# Patient Record
Sex: FEMALE | Race: WHITE | Employment: UNEMPLOYED | URBAN - METROPOLITAN AREA
[De-identification: names, ages, dates, MRNs, and addresses within clinical notes are randomized per-mention and may not be internally consistent; named-entity substitution may affect disease eponyms.]

---

## 2017-02-21 RX ORDER — OMEPRAZOLE 20 MG/1
20 CAPSULE, DELAYED RELEASE ORAL DAILY
Qty: 30 CAPSULE | Refills: 3 | Status: SHIPPED | OUTPATIENT
Start: 2017-02-21 | End: 2017-08-02 | Stop reason: SDUPTHER

## 2017-05-25 ENCOUNTER — HOSPITAL ENCOUNTER (EMERGENCY)
Age: 42
Discharge: HOME | End: 2017-05-25
Payer: COMMERCIAL

## 2017-05-25 ENCOUNTER — APPOINTMENT (OUTPATIENT)
Dept: CT IMAGING | Age: 42
End: 2017-05-25
Payer: COMMERCIAL

## 2017-05-25 VITALS
DIASTOLIC BLOOD PRESSURE: 75 MMHG | BODY MASS INDEX: 23.9 KG/M2 | HEART RATE: 70 BPM | OXYGEN SATURATION: 100 % | TEMPERATURE: 98.3 F | RESPIRATION RATE: 20 BRPM | WEIGHT: 140 LBS | HEIGHT: 64 IN | SYSTOLIC BLOOD PRESSURE: 109 MMHG

## 2017-05-25 DIAGNOSIS — N39.0 URINARY TRACT INFECTION WITHOUT HEMATURIA, SITE UNSPECIFIED: ICD-10-CM

## 2017-05-25 DIAGNOSIS — R10.84 GENERALIZED ABDOMINAL PAIN: Primary | ICD-10-CM

## 2017-05-25 LAB
ALBUMIN SERPL-MCNC: 4.7 G/DL (ref 3.9–4.9)
ALP BLD-CCNC: 69 U/L (ref 40–130)
ALT SERPL-CCNC: 35 U/L (ref 0–33)
AMPHETAMINE SCREEN, URINE: ABNORMAL
ANION GAP SERPL CALCULATED.3IONS-SCNC: 14 MEQ/L (ref 7–13)
APTT: 26.6 SEC (ref 21.6–35.4)
AST SERPL-CCNC: 38 U/L (ref 0–35)
BACTERIA: NORMAL /HPF
BARBITURATE SCREEN URINE: ABNORMAL
BENZODIAZEPINE SCREEN, URINE: ABNORMAL
BILIRUB SERPL-MCNC: 0.7 MG/DL (ref 0–1.2)
BILIRUBIN URINE: NEGATIVE
BLOOD, URINE: NEGATIVE
BUN BLDV-MCNC: 16 MG/DL (ref 6–20)
CALCIUM SERPL-MCNC: 10.2 MG/DL (ref 8.6–10.2)
CANNABINOID SCREEN URINE: POSITIVE
CHLORIDE BLD-SCNC: 101 MEQ/L (ref 98–107)
CLARITY: CLEAR
CO2: 21 MEQ/L (ref 22–29)
COCAINE METABOLITE SCREEN URINE: ABNORMAL
COLOR: YELLOW
CREAT SERPL-MCNC: 0.89 MG/DL (ref 0.5–0.9)
EPITHELIAL CELLS, UA: NORMAL /HPF
ETHANOL PERCENT: NORMAL G/DL
ETHANOL: <10 MG/DL (ref 0–0.08)
GFR AFRICAN AMERICAN: >60
GFR NON-AFRICAN AMERICAN: >60
GLOBULIN: 2.5 G/DL (ref 2.3–3.5)
GLUCOSE BLD-MCNC: 119 MG/DL (ref 74–109)
GLUCOSE URINE: NEGATIVE MG/DL
HCT VFR BLD CALC: 45.1 % (ref 37–47)
HEMOGLOBIN: 15.2 G/DL (ref 12–16)
INR BLD: 0.9
KETONES, URINE: ABNORMAL MG/DL
LACTIC ACID: 2.2 MMOL/L (ref 0.5–2.2)
LEUKOCYTE ESTERASE, URINE: ABNORMAL
LIPASE: 29 U/L (ref 13–60)
Lab: ABNORMAL
MCH RBC QN AUTO: 31.8 PG (ref 27–31.3)
MCHC RBC AUTO-ENTMCNC: 33.6 % (ref 33–37)
MCV RBC AUTO: 94.6 FL (ref 82–100)
NITRITE, URINE: NEGATIVE
OPIATE SCREEN URINE: POSITIVE
PDW BLD-RTO: 12.6 % (ref 11.5–14.5)
PH UA: 8.5 (ref 5–9)
PHENCYCLIDINE SCREEN URINE: ABNORMAL
PLATELET # BLD: 181 K/UL (ref 130–400)
POC CREATININE WHOLE BLOOD: 0.8
POTASSIUM SERPL-SCNC: 4.5 MEQ/L (ref 3.5–5.1)
PREGNANCY TEST URINE, POC: NEGATIVE
PROTEIN UA: ABNORMAL MG/DL
PROTHROMBIN TIME: 10 SEC (ref 8.1–13.7)
RBC # BLD: 4.77 M/UL (ref 4.2–5.4)
RBC UA: NORMAL /HPF (ref 0–2)
SODIUM BLD-SCNC: 136 MEQ/L (ref 132–144)
SPECIFIC GRAVITY UA: 1.02 (ref 1–1.03)
TOTAL PROTEIN: 7.2 G/DL (ref 6.4–8.1)
URINE REFLEX TO CULTURE: YES
UROBILINOGEN, URINE: 0.2 E.U./DL
WBC # BLD: 7.5 K/UL (ref 4.8–10.8)
WBC UA: NORMAL /HPF (ref 0–5)

## 2017-05-25 PROCEDURE — 85730 THROMBOPLASTIN TIME PARTIAL: CPT

## 2017-05-25 PROCEDURE — 2580000003 HC RX 258: Performed by: PHYSICIAN ASSISTANT

## 2017-05-25 PROCEDURE — 87086 URINE CULTURE/COLONY COUNT: CPT

## 2017-05-25 PROCEDURE — G0480 DRUG TEST DEF 1-7 CLASSES: HCPCS

## 2017-05-25 PROCEDURE — 6360000004 HC RX CONTRAST MEDICATION: Performed by: RADIOLOGY

## 2017-05-25 PROCEDURE — 83605 ASSAY OF LACTIC ACID: CPT

## 2017-05-25 PROCEDURE — 99284 EMERGENCY DEPT VISIT MOD MDM: CPT

## 2017-05-25 PROCEDURE — 74177 CT ABD & PELVIS W/CONTRAST: CPT

## 2017-05-25 PROCEDURE — 36415 COLL VENOUS BLD VENIPUNCTURE: CPT

## 2017-05-25 PROCEDURE — 85027 COMPLETE CBC AUTOMATED: CPT

## 2017-05-25 PROCEDURE — 96376 TX/PRO/DX INJ SAME DRUG ADON: CPT

## 2017-05-25 PROCEDURE — 83690 ASSAY OF LIPASE: CPT

## 2017-05-25 PROCEDURE — 80053 COMPREHEN METABOLIC PANEL: CPT

## 2017-05-25 PROCEDURE — 81001 URINALYSIS AUTO W/SCOPE: CPT

## 2017-05-25 PROCEDURE — 80307 DRUG TEST PRSMV CHEM ANLYZR: CPT

## 2017-05-25 PROCEDURE — 96375 TX/PRO/DX INJ NEW DRUG ADDON: CPT

## 2017-05-25 PROCEDURE — 96374 THER/PROPH/DIAG INJ IV PUSH: CPT

## 2017-05-25 PROCEDURE — 6360000002 HC RX W HCPCS: Performed by: PHYSICIAN ASSISTANT

## 2017-05-25 PROCEDURE — 85610 PROTHROMBIN TIME: CPT

## 2017-05-25 RX ORDER — 0.9 % SODIUM CHLORIDE 0.9 %
1000 INTRAVENOUS SOLUTION INTRAVENOUS ONCE
Status: COMPLETED | OUTPATIENT
Start: 2017-05-25 | End: 2017-05-25

## 2017-05-25 RX ORDER — CIPROFLOXACIN 500 MG/1
500 TABLET, FILM COATED ORAL 2 TIMES DAILY
Qty: 14 TABLET | Refills: 0 | Status: SHIPPED | OUTPATIENT
Start: 2017-05-25 | End: 2017-05-30

## 2017-05-25 RX ORDER — MORPHINE SULFATE 4 MG/ML
4 INJECTION, SOLUTION INTRAMUSCULAR; INTRAVENOUS ONCE
Status: COMPLETED | OUTPATIENT
Start: 2017-05-25 | End: 2017-05-25

## 2017-05-25 RX ORDER — ONDANSETRON 2 MG/ML
4 INJECTION INTRAMUSCULAR; INTRAVENOUS ONCE
Status: COMPLETED | OUTPATIENT
Start: 2017-05-25 | End: 2017-05-25

## 2017-05-25 RX ORDER — ONDANSETRON 4 MG/1
4 TABLET, FILM COATED ORAL EVERY 8 HOURS PRN
Qty: 12 TABLET | Refills: 0 | Status: SHIPPED | OUTPATIENT
Start: 2017-05-25 | End: 2022-06-02

## 2017-05-25 RX ORDER — KETOROLAC TROMETHAMINE 30 MG/ML
30 INJECTION, SOLUTION INTRAMUSCULAR; INTRAVENOUS ONCE
Status: COMPLETED | OUTPATIENT
Start: 2017-05-25 | End: 2017-05-25

## 2017-05-25 RX ORDER — SODIUM CHLORIDE 0.9 % (FLUSH) 0.9 %
10 SYRINGE (ML) INJECTION PRN
Status: DISCONTINUED | OUTPATIENT
Start: 2017-05-25 | End: 2017-05-25 | Stop reason: HOSPADM

## 2017-05-25 RX ORDER — DICYCLOMINE HYDROCHLORIDE 10 MG/1
10 CAPSULE ORAL EVERY 6 HOURS PRN
Qty: 20 CAPSULE | Refills: 0 | Status: SHIPPED | OUTPATIENT
Start: 2017-05-25 | End: 2017-05-30

## 2017-05-25 RX ADMIN — MORPHINE SULFATE 4 MG: 4 INJECTION, SOLUTION INTRAMUSCULAR; INTRAVENOUS at 08:32

## 2017-05-25 RX ADMIN — ONDANSETRON 4 MG: 2 INJECTION, SOLUTION INTRAMUSCULAR; INTRAVENOUS at 08:32

## 2017-05-25 RX ADMIN — ONDANSETRON 4 MG: 2 INJECTION, SOLUTION INTRAMUSCULAR; INTRAVENOUS at 09:43

## 2017-05-25 RX ADMIN — SODIUM CHLORIDE 1000 ML: 9 INJECTION, SOLUTION INTRAVENOUS at 08:32

## 2017-05-25 RX ADMIN — IOPAMIDOL 100 ML: 755 INJECTION, SOLUTION INTRAVENOUS at 09:21

## 2017-05-25 RX ADMIN — KETOROLAC TROMETHAMINE 30 MG: 30 INJECTION, SOLUTION INTRAMUSCULAR; INTRAVENOUS at 09:43

## 2017-05-25 ASSESSMENT — PAIN DESCRIPTION - LOCATION
LOCATION: ABDOMEN

## 2017-05-25 ASSESSMENT — ENCOUNTER SYMPTOMS
COLOR CHANGE: 0
VOMITING: 1
NAUSEA: 1
ABDOMINAL DISTENTION: 0
RHINORRHEA: 0
EYE DISCHARGE: 0
SHORTNESS OF BREATH: 0
SORE THROAT: 0
CHOKING: 0
CONSTIPATION: 0
COUGH: 0
ABDOMINAL PAIN: 1

## 2017-05-25 ASSESSMENT — PAIN SCALES - GENERAL
PAINLEVEL_OUTOF10: 7
PAINLEVEL_OUTOF10: 8
PAINLEVEL_OUTOF10: 10
PAINLEVEL_OUTOF10: 5
PAINLEVEL_OUTOF10: 10

## 2017-05-25 ASSESSMENT — PAIN DESCRIPTION - PAIN TYPE: TYPE: ACUTE PAIN

## 2017-05-25 ASSESSMENT — PAIN DESCRIPTION - ORIENTATION
ORIENTATION: MID;UPPER
ORIENTATION: UPPER;MID

## 2017-05-26 LAB — URINE CULTURE, ROUTINE: NORMAL

## 2017-05-30 ENCOUNTER — OFFICE VISIT (OUTPATIENT)
Dept: FAMILY MEDICINE CLINIC | Age: 42
End: 2017-05-30

## 2017-05-30 VITALS
BODY MASS INDEX: 22.36 KG/M2 | TEMPERATURE: 98 F | SYSTOLIC BLOOD PRESSURE: 130 MMHG | WEIGHT: 131 LBS | RESPIRATION RATE: 15 BRPM | DIASTOLIC BLOOD PRESSURE: 80 MMHG | HEIGHT: 64 IN | HEART RATE: 79 BPM

## 2017-05-30 DIAGNOSIS — G89.29 WRIST PAIN, CHRONIC, UNSPECIFIED LATERALITY: ICD-10-CM

## 2017-05-30 DIAGNOSIS — Z11.4 SCREENING FOR HIV WITHOUT PRESENCE OF RISK FACTORS: ICD-10-CM

## 2017-05-30 DIAGNOSIS — M79.642 BILATERAL HAND PAIN: ICD-10-CM

## 2017-05-30 DIAGNOSIS — M79.642 BILATERAL HAND PAIN: Primary | ICD-10-CM

## 2017-05-30 DIAGNOSIS — M79.641 BILATERAL HAND PAIN: Primary | ICD-10-CM

## 2017-05-30 DIAGNOSIS — M25.539 WRIST PAIN, CHRONIC, UNSPECIFIED LATERALITY: ICD-10-CM

## 2017-05-30 DIAGNOSIS — M79.641 BILATERAL HAND PAIN: ICD-10-CM

## 2017-05-30 LAB
ALBUMIN SERPL-MCNC: 4.5 G/DL (ref 3.9–4.9)
ALP BLD-CCNC: 67 U/L (ref 40–130)
ALT SERPL-CCNC: 31 U/L (ref 0–33)
ANION GAP SERPL CALCULATED.3IONS-SCNC: 14 MEQ/L (ref 7–13)
AST SERPL-CCNC: 28 U/L (ref 0–35)
BILIRUB SERPL-MCNC: 0.5 MG/DL (ref 0–1.2)
BUN BLDV-MCNC: 10 MG/DL (ref 6–20)
C-REACTIVE PROTEIN: <0.3 MG/L (ref 0–5)
CALCIUM SERPL-MCNC: 9.1 MG/DL (ref 8.6–10.2)
CHLORIDE BLD-SCNC: 102 MEQ/L (ref 98–107)
CO2: 23 MEQ/L (ref 22–29)
CREAT SERPL-MCNC: 0.64 MG/DL (ref 0.5–0.9)
GFR AFRICAN AMERICAN: >60
GFR NON-AFRICAN AMERICAN: >60
GLOBULIN: 2.3 G/DL (ref 2.3–3.5)
GLUCOSE BLD-MCNC: 102 MG/DL (ref 74–109)
HCT VFR BLD CALC: 40.3 % (ref 37–47)
HEMOGLOBIN: 13.8 G/DL (ref 12–16)
MCH RBC QN AUTO: 31.8 PG (ref 27–31.3)
MCHC RBC AUTO-ENTMCNC: 34.2 % (ref 33–37)
MCV RBC AUTO: 92.9 FL (ref 82–100)
PDW BLD-RTO: 12.3 % (ref 11.5–14.5)
PLATELET # BLD: 177 K/UL (ref 130–400)
POTASSIUM SERPL-SCNC: 4.1 MEQ/L (ref 3.5–5.1)
RBC # BLD: 4.33 M/UL (ref 4.2–5.4)
RHEUMATOID FACTOR: <10 IU/ML (ref 0–14)
SODIUM BLD-SCNC: 139 MEQ/L (ref 132–144)
TOTAL PROTEIN: 6.8 G/DL (ref 6.4–8.1)
WBC # BLD: 5.1 K/UL (ref 4.8–10.8)

## 2017-05-30 PROCEDURE — 99213 OFFICE O/P EST LOW 20 MIN: CPT | Performed by: NURSE PRACTITIONER

## 2017-05-30 ASSESSMENT — PATIENT HEALTH QUESTIONNAIRE - PHQ9
2. FEELING DOWN, DEPRESSED OR HOPELESS: 0
SUM OF ALL RESPONSES TO PHQ9 QUESTIONS 1 & 2: 0
SUM OF ALL RESPONSES TO PHQ QUESTIONS 1-9: 0
1. LITTLE INTEREST OR PLEASURE IN DOING THINGS: 0

## 2017-05-31 LAB — HIV-1 AND HIV-2 ANTIBODIES: NEGATIVE

## 2017-06-01 DIAGNOSIS — R76.8 ELEVATED ANTINUCLEAR ANTIBODY (ANA) LEVEL: Primary | ICD-10-CM

## 2017-06-01 LAB
ANA INTERPRETATION: ABNORMAL
ANA PATTERN: ABNORMAL
ANA TITER: ABNORMAL
ANTI-NUCLEAR ANTIBODY (ANA): POSITIVE

## 2017-07-10 ENCOUNTER — HOSPITAL ENCOUNTER (EMERGENCY)
Age: 42
Discharge: HOME | End: 2017-07-10
Payer: COMMERCIAL

## 2017-07-10 VITALS
BODY MASS INDEX: 22.36 KG/M2 | WEIGHT: 131 LBS | TEMPERATURE: 98.1 F | HEART RATE: 52 BPM | RESPIRATION RATE: 12 BRPM | SYSTOLIC BLOOD PRESSURE: 125 MMHG | OXYGEN SATURATION: 100 % | HEIGHT: 64 IN | DIASTOLIC BLOOD PRESSURE: 71 MMHG

## 2017-07-10 DIAGNOSIS — H60.501 ACUTE OTITIS EXTERNA OF RIGHT EAR, UNSPECIFIED TYPE: Primary | ICD-10-CM

## 2017-07-10 DIAGNOSIS — H65.04 RECURRENT ACUTE SEROUS OTITIS MEDIA OF RIGHT EAR: ICD-10-CM

## 2017-07-10 PROCEDURE — 99282 EMERGENCY DEPT VISIT SF MDM: CPT

## 2017-07-10 RX ORDER — NEOMYCIN SULFATE, POLYMYXIN B SULFATE, HYDROCORTISONE 3.5; 10000; 1 MG/ML; [USP'U]/ML; MG/ML
2 SOLUTION/ DROPS AURICULAR (OTIC) EVERY 8 HOURS SCHEDULED
Qty: 1 BOTTLE | Refills: 1 | Status: SHIPPED | OUTPATIENT
Start: 2017-07-10 | End: 2017-07-20

## 2017-07-10 RX ORDER — AMOXICILLIN AND CLAVULANATE POTASSIUM 875; 125 MG/1; MG/1
1 TABLET, FILM COATED ORAL 2 TIMES DAILY
Qty: 20 TABLET | Refills: 0 | Status: SHIPPED | OUTPATIENT
Start: 2017-07-10 | End: 2017-07-20

## 2017-07-10 ASSESSMENT — PAIN DESCRIPTION - FREQUENCY: FREQUENCY: CONTINUOUS

## 2017-07-10 ASSESSMENT — PAIN SCALES - GENERAL: PAINLEVEL_OUTOF10: 7

## 2017-07-10 ASSESSMENT — PAIN DESCRIPTION - LOCATION: LOCATION: EAR

## 2017-07-10 ASSESSMENT — PAIN DESCRIPTION - PAIN TYPE: TYPE: ACUTE PAIN

## 2017-07-10 ASSESSMENT — PAIN DESCRIPTION - ORIENTATION: ORIENTATION: RIGHT;LEFT

## 2017-07-11 ASSESSMENT — ENCOUNTER SYMPTOMS
SHORTNESS OF BREATH: 0
VOMITING: 0
NAUSEA: 0
ABDOMINAL PAIN: 0
DIARRHEA: 0
COUGH: 0

## 2017-08-03 RX ORDER — OMEPRAZOLE 20 MG/1
CAPSULE, DELAYED RELEASE ORAL
Qty: 30 CAPSULE | Refills: 3 | Status: SHIPPED | OUTPATIENT
Start: 2017-08-03 | End: 2022-06-02

## 2017-08-17 ENCOUNTER — HOSPITAL ENCOUNTER (EMERGENCY)
Age: 42
Discharge: HOME | End: 2017-08-17

## 2017-08-17 ENCOUNTER — APPOINTMENT (OUTPATIENT)
Dept: GENERAL RADIOLOGY | Age: 42
End: 2017-08-17

## 2017-08-17 VITALS
SYSTOLIC BLOOD PRESSURE: 143 MMHG | TEMPERATURE: 98.8 F | OXYGEN SATURATION: 99 % | WEIGHT: 130 LBS | BODY MASS INDEX: 22.67 KG/M2 | RESPIRATION RATE: 18 BRPM | HEART RATE: 82 BPM | DIASTOLIC BLOOD PRESSURE: 66 MMHG

## 2017-08-17 DIAGNOSIS — L02.612 FOOT ABSCESS, LEFT: Primary | ICD-10-CM

## 2017-08-17 LAB
CHP ED QC CHECK: YES
PREGNANCY TEST URINE, POC: NORMAL

## 2017-08-17 PROCEDURE — 99283 EMERGENCY DEPT VISIT LOW MDM: CPT

## 2017-08-17 PROCEDURE — 73650 X-RAY EXAM OF HEEL: CPT

## 2017-08-17 PROCEDURE — 2500000003 HC RX 250 WO HCPCS: Performed by: PHYSICIAN ASSISTANT

## 2017-08-17 RX ORDER — SULFAMETHOXAZOLE AND TRIMETHOPRIM 800; 160 MG/1; MG/1
1 TABLET ORAL 2 TIMES DAILY
Qty: 14 TABLET | Refills: 0 | Status: SHIPPED | OUTPATIENT
Start: 2017-08-17 | End: 2017-08-24

## 2017-08-17 RX ORDER — LIDOCAINE HYDROCHLORIDE 10 MG/ML
5 INJECTION, SOLUTION EPIDURAL; INFILTRATION; INTRACAUDAL; PERINEURAL ONCE
Status: COMPLETED | OUTPATIENT
Start: 2017-08-17 | End: 2017-08-17

## 2017-08-17 RX ORDER — IBUPROFEN 800 MG/1
800 TABLET ORAL EVERY 8 HOURS PRN
Qty: 12 TABLET | Refills: 0 | Status: SHIPPED | OUTPATIENT
Start: 2017-08-17 | End: 2022-06-02

## 2017-08-17 RX ADMIN — LIDOCAINE HYDROCHLORIDE 5 ML: 10 INJECTION, SOLUTION EPIDURAL; INFILTRATION; INTRACAUDAL; PERINEURAL at 19:10

## 2017-08-17 ASSESSMENT — PAIN DESCRIPTION - PAIN TYPE: TYPE: ACUTE PAIN

## 2017-08-17 ASSESSMENT — PAIN SCALES - GENERAL: PAINLEVEL_OUTOF10: 5

## 2017-08-17 ASSESSMENT — PAIN DESCRIPTION - LOCATION: LOCATION: FOOT

## 2017-08-17 ASSESSMENT — PAIN DESCRIPTION - ORIENTATION: ORIENTATION: LEFT

## 2017-08-17 ASSESSMENT — ENCOUNTER SYMPTOMS: COLOR CHANGE: 1

## 2017-08-17 ASSESSMENT — PAIN DESCRIPTION - DESCRIPTORS: DESCRIPTORS: POUNDING

## 2022-06-02 ENCOUNTER — HOSPITAL ENCOUNTER (EMERGENCY)
Age: 47
Discharge: HOME | End: 2022-06-03
Attending: STUDENT IN AN ORGANIZED HEALTH CARE EDUCATION/TRAINING PROGRAM
Payer: MEDICAID

## 2022-06-02 DIAGNOSIS — T40.1X1A ACCIDENTAL OVERDOSE OF HEROIN, INITIAL ENCOUNTER (HCC): Primary | ICD-10-CM

## 2022-06-02 PROCEDURE — 2580000003 HC RX 258: Performed by: STUDENT IN AN ORGANIZED HEALTH CARE EDUCATION/TRAINING PROGRAM

## 2022-06-02 PROCEDURE — 99284 EMERGENCY DEPT VISIT MOD MDM: CPT

## 2022-06-02 PROCEDURE — 6360000002 HC RX W HCPCS: Performed by: STUDENT IN AN ORGANIZED HEALTH CARE EDUCATION/TRAINING PROGRAM

## 2022-06-02 PROCEDURE — 96374 THER/PROPH/DIAG INJ IV PUSH: CPT

## 2022-06-02 RX ORDER — 0.9 % SODIUM CHLORIDE 0.9 %
500 INTRAVENOUS SOLUTION INTRAVENOUS ONCE
Status: COMPLETED | OUTPATIENT
Start: 2022-06-02 | End: 2022-06-03

## 2022-06-02 RX ORDER — KETOROLAC TROMETHAMINE 15 MG/ML
15 INJECTION, SOLUTION INTRAMUSCULAR; INTRAVENOUS ONCE
Status: COMPLETED | OUTPATIENT
Start: 2022-06-02 | End: 2022-06-02

## 2022-06-02 RX ADMIN — KETOROLAC TROMETHAMINE 15 MG: 15 INJECTION, SOLUTION INTRAMUSCULAR; INTRAVENOUS at 23:58

## 2022-06-02 RX ADMIN — SODIUM CHLORIDE 500 ML: 9 INJECTION, SOLUTION INTRAVENOUS at 23:57

## 2022-06-02 ASSESSMENT — ENCOUNTER SYMPTOMS
NAUSEA: 0
SHORTNESS OF BREATH: 0
SORE THROAT: 0
DIARRHEA: 0
CHEST TIGHTNESS: 0
EYE PAIN: 0
BACK PAIN: 0

## 2022-06-02 ASSESSMENT — PAIN DESCRIPTION - DESCRIPTORS
DESCRIPTORS: THROBBING
DESCRIPTORS: THROBBING

## 2022-06-02 ASSESSMENT — PAIN DESCRIPTION - LOCATION
LOCATION: HEAD
LOCATION: HEAD

## 2022-06-02 ASSESSMENT — PAIN - FUNCTIONAL ASSESSMENT
PAIN_FUNCTIONAL_ASSESSMENT: ACTIVITIES ARE NOT PREVENTED
PAIN_FUNCTIONAL_ASSESSMENT: ACTIVITIES ARE NOT PREVENTED

## 2022-06-02 ASSESSMENT — PAIN SCALES - GENERAL
PAINLEVEL_OUTOF10: 7
PAINLEVEL_OUTOF10: 7

## 2022-06-03 VITALS
OXYGEN SATURATION: 94 % | HEIGHT: 63 IN | HEART RATE: 77 BPM | TEMPERATURE: 100.3 F | DIASTOLIC BLOOD PRESSURE: 62 MMHG | SYSTOLIC BLOOD PRESSURE: 109 MMHG | BODY MASS INDEX: 21.26 KG/M2 | WEIGHT: 120 LBS | RESPIRATION RATE: 13 BRPM

## 2022-06-03 RX ORDER — NALOXONE HYDROCHLORIDE 4 MG/.1ML
1 SPRAY NASAL PRN
Qty: 1 EACH | Refills: 0 | Status: SHIPPED | OUTPATIENT
Start: 2022-06-03

## 2022-06-03 ASSESSMENT — PAIN DESCRIPTION - LOCATION
LOCATION: HEAD
LOCATION: HEAD

## 2022-06-03 ASSESSMENT — PAIN DESCRIPTION - DESCRIPTORS
DESCRIPTORS: THROBBING
DESCRIPTORS: THROBBING

## 2022-06-03 ASSESSMENT — PAIN SCALES - GENERAL
PAINLEVEL_OUTOF10: 4
PAINLEVEL_OUTOF10: 4

## 2022-06-03 NOTE — ED NOTES
Patient is alert and oriented x4, skin is pink, warm, dry. Respirations are even and non-labored. D/C instructions explained to patient who voices understanding. Ambulatory from ED with no distress observed.      Catrachito Islas RN  06/03/22 1721

## 2022-06-03 NOTE — ED PROVIDER NOTES
[facility] ED  eMERGENCYdEPARTMENT eNCOUnter      Pt Name: Jesusita Aldridge  MRN: [id number]  Virginiagfcolin 1975  Date of evaluation: 6/2/2022  Alice Washington PA-C    CHIEF COMPLAINT           HPI  Jesusita Aldridge is a 49 y.o. female presents after an accidental overdose. Patient reports gradual onset, moderate, nonpainful drowsiness and lethargy after snorting heroin a little while ago. Patient states her friends became worried on her state and called the ambulance. EMS gave her 2 mg of IV Narcan in route and patient became alert and oriented. Patient reports no symptoms at this time currently. ROS  Review of Systems   Constitutional: Negative for chills, fatigue and fever. HENT: Negative for ear pain, hearing loss and sore throat. Eyes: Negative for pain and visual disturbance. Respiratory: Negative for chest tightness and shortness of breath. Cardiovascular: Negative for chest pain. Gastrointestinal: Negative for diarrhea and nausea. Endocrine: Negative for cold intolerance. Genitourinary: Negative for hematuria. Musculoskeletal: Negative for back pain. Skin: Negative for rash and wound. Neurological: Negative for dizziness and headaches. Psychiatric/Behavioral: Negative for behavioral problems and confusion. Except as noted above the remainder of the review of systems was reviewed and negative.        PAST MEDICAL HISTORY     Past Medical History:   Diagnosis Date    DNS (deviated nasal septum)     Hypertrophy of nasal turbinates          SURGICAL HISTORY       Past Surgical History:   Procedure Laterality Date    MYRINGOTOMY AND TYMPANOSTOMY TUBE PLACEMENT Bilateral 11/10/2016    BILATERAL MYRINGOTOMY  WITH VENTILATING  TUBE INSERTION performed by Shoshana Palmer MD at [facility] 11/10/2016    SEPTOPLASTY MICRODEBRIDER ASSISTED TURBINOPLASTY AND OUT-FRACTURING  performed by Shoshana Palmer MD at [address] [place] [address] TUBAL LIGATION           CURRENTMEDICATIONS       Discharge Medication List as of 6/3/2022  1:31 AM      CONTINUE these medications which have NOT CHANGED    Details   citalopram (CELEXA) 20 MG tablet Take 1 tablet by mouth daily, Disp-30 tablet, R-3             ALLERGIES     Penicillins    FAMILY HISTORY       Family History   Problem Relation Age of Onset    Cancer Mother     High Blood Pressure Father     High Cholesterol Father     Stroke Father     Cancer Paternal Aunt     Diabetes Maternal Grandmother     Diabetes Maternal Grandfather     Arthritis Paternal Grandmother     Diabetes Paternal Grandmother     Diabetes Paternal Grandfather           SOCIAL HISTORY       Social History     Socioeconomic History    Marital status:      Spouse name: None    Number of children: None    Years of education: None    Highest education level: None   Occupational History    None   Tobacco Use    Smoking status: Current Every Day Smoker     Packs/day: 0.50     Types: Cigarettes    Smokeless tobacco: Never Used   Vaping Use    Vaping Use: Never used   Substance and Sexual Activity    Alcohol use: Not Currently     Comment: ocassionally    Drug use: Yes     Types: Marijuana ([place]), Other-see comments     Comment: heroin/patient states she uses drugs when she has montey to buy them.  Sexual activity: Yes   Other Topics Concern    None   Social History Narrative    None     Social Determinants of Health     Financial Resource Strain:     Difficulty of Paying Living Expenses: Not on file   Food Insecurity:     Worried About Running Out of Food in the Last Year: Not on file    Gregory of Food in the Last Year: Not on file   Transportation Needs:     Lack of Transportation (Medical): Not on file    Lack of Transportation (Non-Medical):  Not on file   Physical Activity:     Days of Exercise per Week: Not on file    Minutes of Exercise per Session: Not on file   Stress:     Feeling of Stress : Not on file   Social Connections:     Frequency of Communication with Friends and Family: Not on file    Frequency of Social Gatherings with Friends and Family: Not on file    Attends Confucianism Services: Not on file    Active Member of Clubs or Organizations: Not on file    Attends Club or Organization Meetings: Not on file    Marital Status: Not on file   Intimate Partner Violence:     Fear of Current or Ex-Partner: Not on file    Emotionally Abused: Not on file    Physically Abused: Not on file    Sexually Abused: Not on file   Housing Stability:     Unable to Pay for Housing in the Last Year: Not on file    Number of [place] in the Last Year: Not on file    Unstable Housing in the Last Year: Not on file         PHYSICAL EXAM       ED Triage Vitals [06/02/22 2323]   BP Temp Temp Source Heart Rate Resp SpO2 Height Weight   131/87 100.3 °F (37.9 °C) Oral (!) 103 20 98 % 5' 3\" (1.6 m) 120 lb (54.4 kg)       Physical Exam  Constitutional:       Appearance: Normal appearance. HENT:      Head: Normocephalic and atraumatic. Right Ear: External ear normal.      Left Ear: External ear normal.      Nose: Nose normal.      Mouth/Throat:      Mouth: Mucous membranes are moist.   Eyes:      Extraocular Movements: Extraocular movements intact. Conjunctiva/sclera: Conjunctivae normal.   Cardiovascular:      Rate and Rhythm: Normal rate and regular rhythm. Heart sounds: Normal heart sounds. Pulmonary:      Effort: Pulmonary effort is normal.      Breath sounds: Normal breath sounds. No stridor. No wheezing or rhonchi. Abdominal:      Palpations: Abdomen is soft. Tenderness: There is no abdominal tenderness. Musculoskeletal:         General: Normal range of motion. Cervical back: Normal range of motion and neck supple. No tenderness. Skin:     General: Skin is warm and dry. Neurological:      General: No focal deficit present.       Mental Status: She is alert and oriented to person, place, and time. Psychiatric:         Mood and Affect: Mood normal.         Behavior: Behavior normal.           MDM  This is a 35 or female presenting after an accidental overdose of heroin. Patient is afebrile and hemodynamically stable. Patient given 500 mL normal saline. Patient watched in the ED for 2 hours and did not progress. Patient states she feels completely fine. She is agreeable to discharge with education on opioid abuse and resources for addiction medicine. Patient agreeable to this plan. She will return if symptoms change or worsen. FINAL IMPRESSION      1.  Accidental overdose of heroin, initial encounter [facility])          DISPOSITION/PLAN   DISPOSITION Decision To Discharge 06/03/2022 01:30:44 AM        DISCHARGE MEDICATIONS:  [unfilled]         Varghese Faulkner PA-C(electronically signed)  Attending Emergency Physician           Varghese Faulkner PA-C  06/03/22 4282

## 2024-02-04 ENCOUNTER — HOSPITAL ENCOUNTER (EMERGENCY)
Age: 49
Discharge: HOME | End: 2024-02-04
Attending: FAMILY MEDICINE
Payer: MEDICAID

## 2024-02-04 VITALS
OXYGEN SATURATION: 92 % | DIASTOLIC BLOOD PRESSURE: 68 MMHG | RESPIRATION RATE: 18 BRPM | HEIGHT: 64 IN | BODY MASS INDEX: 21 KG/M2 | HEART RATE: 81 BPM | TEMPERATURE: 98.3 F | SYSTOLIC BLOOD PRESSURE: 118 MMHG | WEIGHT: 123 LBS

## 2024-02-04 DIAGNOSIS — T40.601A OPIATE OVERDOSE, ACCIDENTAL OR UNINTENTIONAL, INITIAL ENCOUNTER (HCC): Primary | ICD-10-CM

## 2024-02-04 DIAGNOSIS — F11.20 HEROIN ADDICTION (HCC): ICD-10-CM

## 2024-02-04 LAB
ALBUMIN SERPL-MCNC: 3.9 G/DL (ref 3.5–4.6)
ALP SERPL-CCNC: 70 U/L (ref 40–130)
ALT SERPL-CCNC: 26 U/L (ref 0–33)
AMPHET UR QL SCN: ABNORMAL
ANION GAP SERPL CALCULATED.3IONS-SCNC: 9 MEQ/L (ref 9–15)
AST SERPL-CCNC: 31 U/L (ref 0–35)
BARBITURATES UR QL SCN: ABNORMAL
BASOPHILS # BLD: 0 K/UL (ref 0–0.2)
BASOPHILS NFR BLD: 0.4 %
BENZODIAZ UR QL SCN: ABNORMAL
BILIRUB SERPL-MCNC: 0.3 MG/DL (ref 0.2–0.7)
BUN SERPL-MCNC: 14 MG/DL (ref 6–20)
CALCIUM SERPL-MCNC: 8.9 MG/DL (ref 8.5–9.9)
CANNABINOIDS UR QL SCN: POSITIVE
CHLORIDE SERPL-SCNC: 101 MEQ/L (ref 95–107)
CO2 SERPL-SCNC: 27 MEQ/L (ref 20–31)
COCAINE UR QL SCN: ABNORMAL
CREAT SERPL-MCNC: 0.78 MG/DL (ref 0.5–0.9)
DRUG SCREEN COMMENT UR-IMP: ABNORMAL
EOSINOPHIL # BLD: 0.2 K/UL (ref 0–0.7)
EOSINOPHIL NFR BLD: 2.1 %
ERYTHROCYTE [DISTWIDTH] IN BLOOD BY AUTOMATED COUNT: 12.3 % (ref 11.5–14.5)
ETHANOL PERCENT: NORMAL G/DL
ETHANOLAMINE SERPL-MCNC: <10 MG/DL (ref 0–0.08)
FENTANYL SCREEN, URINE: POSITIVE
GLOBULIN SER CALC-MCNC: 2.3 G/DL (ref 2.3–3.5)
GLUCOSE SERPL-MCNC: 199 MG/DL (ref 70–99)
HCT VFR BLD AUTO: 36.2 % (ref 37–47)
HGB BLD-MCNC: 12.2 G/DL (ref 12–16)
LYMPHOCYTES # BLD: 1.2 K/UL (ref 1–4.8)
LYMPHOCYTES NFR BLD: 11.3 %
MAGNESIUM SERPL-MCNC: 2.1 MG/DL (ref 1.7–2.4)
MCH RBC QN AUTO: 31.9 PG (ref 27–31.3)
MCHC RBC AUTO-ENTMCNC: 33.7 % (ref 33–37)
MCV RBC AUTO: 94.8 FL (ref 79.4–94.8)
METHADONE UR QL SCN: ABNORMAL
MONOCYTES # BLD: 0.5 K/UL (ref 0.2–0.8)
MONOCYTES NFR BLD: 5 %
NEUTROPHILS # BLD: 8.6 K/UL (ref 1.4–6.5)
NEUTS SEG NFR BLD: 81 %
OPIATES UR QL SCN: ABNORMAL
OXYCODONE UR QL SCN: ABNORMAL
PCP UR QL SCN: ABNORMAL
PLATELET # BLD AUTO: 208 K/UL (ref 130–400)
POTASSIUM SERPL-SCNC: 3.3 MEQ/L (ref 3.4–4.9)
PROPOXYPH UR QL SCN: ABNORMAL
PROT SERPL-MCNC: 6.2 G/DL (ref 6.3–8)
RBC # BLD AUTO: 3.82 M/UL (ref 4.2–5.4)
SODIUM SERPL-SCNC: 137 MEQ/L (ref 135–144)
WBC # BLD AUTO: 10.6 K/UL (ref 4.8–10.8)

## 2024-02-04 PROCEDURE — 80307 DRUG TEST PRSMV CHEM ANLYZR: CPT

## 2024-02-04 PROCEDURE — 85025 COMPLETE CBC W/AUTO DIFF WBC: CPT

## 2024-02-04 PROCEDURE — 36415 COLL VENOUS BLD VENIPUNCTURE: CPT

## 2024-02-04 PROCEDURE — 83735 ASSAY OF MAGNESIUM: CPT

## 2024-02-04 PROCEDURE — 82077 ASSAY SPEC XCP UR&BREATH IA: CPT

## 2024-02-04 PROCEDURE — 80053 COMPREHEN METABOLIC PANEL: CPT

## 2024-02-04 PROCEDURE — 99284 EMERGENCY DEPT VISIT MOD MDM: CPT

## 2024-02-04 PROCEDURE — 2580000003 HC RX 258: Performed by: FAMILY MEDICINE

## 2024-02-04 RX ORDER — 0.9 % SODIUM CHLORIDE 0.9 %
1000 INTRAVENOUS SOLUTION INTRAVENOUS ONCE
Status: COMPLETED | OUTPATIENT
Start: 2024-02-04 | End: 2024-02-04

## 2024-02-04 RX ADMIN — SODIUM CHLORIDE 1000 ML: 9 INJECTION, SOLUTION INTRAVENOUS at 17:57

## 2024-02-04 ASSESSMENT — PAIN - FUNCTIONAL ASSESSMENT
PAIN_FUNCTIONAL_ASSESSMENT: NONE - DENIES PAIN
PAIN_FUNCTIONAL_ASSESSMENT: NONE - DENIES PAIN

## 2024-02-04 ASSESSMENT — LIFESTYLE VARIABLES
HOW MANY STANDARD DRINKS CONTAINING ALCOHOL DO YOU HAVE ON A TYPICAL DAY: PATIENT DOES NOT DRINK
HOW OFTEN DO YOU HAVE A DRINK CONTAINING ALCOHOL: NEVER

## 2024-02-04 NOTE — ED TRIAGE NOTES
Patient was given Narcan by EMS.  Patient arrived via EMS and was able to walk to bed.  Patient changed into gown.  Patient is alert and oriented.  Patient states \"I think I took too much fentanyl\".  Patient expresses a desire to go to rehab.

## 2024-02-05 NOTE — ED PROVIDER NOTES
Magnesium 3.3 (*)     Glucose 199 (*)     Total Protein 6.2 (*)     All other components within normal limits   URINE DRUG SCREEN - Abnormal; Notable for the following components:    Cannabinoid Scrn, Ur POSITIVE (*)     FENTANYL SCREEN, URINE POSITIVE (*)     All other components within normal limits   ETHANOL   MAGNESIUM       All other labs were within normal range or not returned as of this dictation.    Procedures      Medical Decision Making  49 years old presented to the ED D after an accidental fentanyl overdose.  Patient admitted that she was doing [place] became unconscious her daughter called the squad squad given her nasal and IV oral Narcan and immediately became more awake alert oriented on arrival to the ER she is awake alert oriented states she has been struggling with heroin addiction for long time on and off.  Patient is interested in inpatient  In the ED patient was given IV fluid remained hemodynamically stable was evaluated by gets lets get real.  Patient was discharged to go to inpatient rehab tonight.    Amount and/or Complexity of Data Reviewed  Labs: ordered.    Risk  Prescription drug management.       MDM  Reviewed: previous chart, nursing note and vitals         FINAL IMPRESSION      1. Opiate overdose, accidental or unintentional, initial encounter (HCC)    2. Heroin addiction (HCC)          DISPOSITION/PLAN   DISPOSITION Decision To Discharge 02/04/2024 08:48:19 PM      PATIENT REFERRED TO:  No follow-up provider specified.    DISCHARGE MEDICATIONS:  New Prescriptions    No medications on file          (Please note thatportions of this note were completed with a voice recognition program.  Efforts were made to edit the dictations but occasionally words are mis-transcribed.)    LG MCCLELLAND MD (electronically signed)  Attending Emergency Physician         Lg Mcclelland MD  02/04/24 2394

## 2024-02-05 NOTE — ED NOTES
Pt provided with discharge instructions and f/u care instructions. Verbalizes understanding; denies questions. Pt ambulatory off unit accompanied by [facility] staff in stable condition.

## 2025-01-06 ENCOUNTER — OFFICE VISIT (OUTPATIENT)
Dept: PRIMARY CARE | Facility: CLINIC | Age: 50
End: 2025-01-06
Payer: MEDICAID

## 2025-01-06 VITALS
BODY MASS INDEX: 28 KG/M2 | DIASTOLIC BLOOD PRESSURE: 80 MMHG | HEIGHT: 64 IN | WEIGHT: 164 LBS | RESPIRATION RATE: 16 BRPM | OXYGEN SATURATION: 98 % | TEMPERATURE: 97.8 F | SYSTOLIC BLOOD PRESSURE: 140 MMHG | HEART RATE: 84 BPM

## 2025-01-06 DIAGNOSIS — H44.003 INFECTION OF BOTH EYES: ICD-10-CM

## 2025-01-06 DIAGNOSIS — H10.33 ACUTE BACTERIAL CONJUNCTIVITIS OF BOTH EYES: Primary | ICD-10-CM

## 2025-01-06 DIAGNOSIS — H57.89 REDNESS AND DISCHARGE OF EYE: ICD-10-CM

## 2025-01-06 PROCEDURE — 99202 OFFICE O/P NEW SF 15 MIN: CPT | Performed by: NURSE PRACTITIONER

## 2025-01-06 RX ORDER — TOBRAMYCIN 3 MG/ML
1 SOLUTION/ DROPS OPHTHALMIC EVERY 6 HOURS
Qty: 5 ML | Refills: 0 | Status: SHIPPED | OUTPATIENT
Start: 2025-01-06 | End: 2025-01-13

## 2025-01-06 ASSESSMENT — ENCOUNTER SYMPTOMS
OCCASIONAL FEELINGS OF UNSTEADINESS: 0
DEPRESSION: 0
LOSS OF SENSATION IN FEET: 0

## 2025-01-06 ASSESSMENT — PATIENT HEALTH QUESTIONNAIRE - PHQ9
SUM OF ALL RESPONSES TO PHQ9 QUESTIONS 1 AND 2: 0
1. LITTLE INTEREST OR PLEASURE IN DOING THINGS: NOT AT ALL
2. FEELING DOWN, DEPRESSED OR HOPELESS: NOT AT ALL

## 2025-01-06 NOTE — PROGRESS NOTES
Subjective   Patient ID: Jennifer Ramirez is a 49 y.o. female who is with complaints of redness, irritation, and discharge on both eyes.    HPI   Patient is a 49 y.o. female who CONSULTED AT [facility] today. Patient is with complaints of redness, irritation, and discharge on both eyes. she states that the symptoms started about 7 days ago. she denies trauma/injury to the eye, use of contact lens, nor any exposure to people with same symptoms. she states there were some yellow mucoid discharge that became crusty after a day. she states there were no eye pain nor photophobia. she denies any blurring of vision, visual floaters, double vision, nor change in visual acuity. she denies fever, nausea, vomiting, headache, nor any nasal congestion nor discharge.    Review of Systems  General: no weight loss, generally healthy, no fatigue  Head:  no headaches / sinus pain, no vertigo, no injury  Eyes: (+) redness, irritation, and discharge on both eyes, no diplopia,  Ears: no change in hearing, no tinnitus, no bleeding, no vertigo  Mouth:  no dental difficulties, no gingival bleeding, no sore throat, no loss of sense of taste  Nose: no congestion, no  discharge, no bleeding, no obstruction, no loss of sense of smell  Neck: no stiffness, no pain, no tenderness, no masses, no bruit  Pulmonary: no dyspnea, no wheezing, no hemoptysis, no cough  Cardiovascular: no chest pain, no palpitations, no syncope, no orthopnea  Gastrointestinal: no change in appetite, no dysphagia, no abdominal pains, no diarrhea, no emesis, no melena  Genito Urinary: no dysuria, no urinary urgency, no nocturia, no incontinence, no change in nature of urine  Musculoskeletal: no muscle ache, no joint pain, no limitation of range of motion, no paresthesia, no numbness  Constitutional: no fever, no chills, no night sweats    Objective   /80 (BP Location: Right arm, Patient Position: Sitting, BP Cuff Size: Adult)   Pulse 84   Temp  "36.6 °C (97.8 °F) (Temporal)   Resp 16   Ht 1.626 m (5' 4\")   Wt 74.4 kg (164 lb)   SpO2 98%   BMI 28.15 kg/m²     Physical Exam  General: ambulatory, in no acute distress  Head: normocephalic, no lesions  Eyes: pink palpebral conjunctiva, anicteric sclerae, PERRLA, EOM's full, RIGHT AND LEFT EYES: (+) subconjunctival injection, (+) redness, (+) tearing, (+) yellow mucoid discharge, no photophobia  Ears: clear external auditory canals, no ear discharge, no bleeding from the ears, tympanic membrane intact  Nose: nasal mucosa normal, no nasal discharge, no bleeding, no obstruction  Throat: clear, no exudate, no lesions  Neck: supple, no masses, no bruits    Assessment/Plan   Problem List Items Addressed This Visit    None  Visit Diagnoses         Codes    Acute bacterial conjunctivitis of both eyes    -  Primary H10.33    Relevant Medications    tobramycin (Tobrex) 0.3 % ophthalmic solution    Infection of both eyes     H44.003    Relevant Medications    tobramycin (Tobrex) 0.3 % ophthalmic solution    Redness and discharge of eye     H57.89    Relevant Medications    tobramycin (Tobrex) 0.3 % ophthalmic solution        DISCHARGE SUMMARY:   Patient was seen and examined. Diagnosis, treatment, treatment options, and possible complications of today's illness discussed and explained to patient. Patient to take medication/s associated with this visit. Patient may also take OTC analgesic/ antipyretic if needed for pain/fever. Advised eye protection, hand hygiene/washing, personal hygiene, and refrain from using contact lenses, eye shadows/liners, nor mascara. Advised to come back if with worsening or persistent symptoms. Patient verbalized understanding of plan of care.    Patient to come back in 7 - 10 days if needed for worsening symptoms.         "

## 2025-01-06 NOTE — PATIENT INSTRUCTIONS
DISCHARGE SUMMARY:   Patient was seen and examined. Diagnosis, treatment, treatment options, and possible complications of today's illness discussed and explained to patient. Patient to take medication/s associated with this visit. Patient may also take OTC analgesic/ antipyretic if needed for pain/fever. Advised eye protection, hand hygiene/washing, personal hygiene, and refrain from using contact lenses, eye shadows/liners, nor mascara. Advised to come back if with worsening or persistent symptoms. Patient verbalized understanding of plan of care.    Patient to come back in 7 - 10 days if needed for worsening symptoms.

## 2025-06-02 ENCOUNTER — APPOINTMENT (OUTPATIENT)
Dept: ORTHOPEDIC SURGERY | Facility: CLINIC | Age: 50
End: 2025-06-02
Payer: MEDICAID

## 2025-06-02 ENCOUNTER — HOSPITAL ENCOUNTER (OUTPATIENT)
Dept: RADIOLOGY | Facility: HOSPITAL | Age: 50
Discharge: HOME | End: 2025-06-02
Payer: MEDICAID

## 2025-06-02 DIAGNOSIS — M25.551 PAIN OF RIGHT HIP: ICD-10-CM

## 2025-06-02 DIAGNOSIS — M16.11 ARTHRITIS OF RIGHT HIP: ICD-10-CM

## 2025-06-02 PROCEDURE — 99204 OFFICE O/P NEW MOD 45 MIN: CPT | Performed by: ORTHOPAEDIC SURGERY

## 2025-06-02 PROCEDURE — 73502 X-RAY EXAM HIP UNI 2-3 VIEWS: CPT | Mod: RT

## 2025-06-02 PROCEDURE — 73502 X-RAY EXAM HIP UNI 2-3 VIEWS: CPT | Mod: RIGHT SIDE | Performed by: RADIOLOGY

## 2025-06-02 NOTE — PROGRESS NOTES
History of Present Illness  Chief Complaint   Patient presents with    Right Hip - Pain       Patient with known osteoarthritis of the right hip who presents today for repeat evaluation.  The patient notes worsening hip pain.  The patient notes worsening mechanical symptoms.  The patient has tried the following modalities NSAIDs, tylenol, activity modification and therapy with no relief.      Medical History[1]    Medication Documentation Review Audit       Reviewed by LISSA Eng (Nurse Practitioner) on 01/06/25 at 1245      Medication Order Taking? Sig Documenting Provider Last Dose Status            No Medications to Display                                   RX Allergies[2]    Social History     Socioeconomic History    Marital status:      Spouse name: Not on file    Number of children: Not on file    Years of education: Not on file    Highest education level: Not on file   Occupational History    Not on file   Tobacco Use    Smoking status: Former     Types: Cigarettes    Smokeless tobacco: Not on file   Substance and Sexual Activity    Alcohol use: Not on file    Drug use: Not on file    Sexual activity: Not on file   Other Topics Concern    Not on file   Social History Narrative    Not on file     Social Drivers of Health     Financial Resource Strain: Not on file   Food Insecurity: Not on file   Transportation Needs: Not on file   Physical Activity: Not on file   Stress: Not on file   Social Connections: Not on file   Intimate Partner Violence: Not on file   Housing Stability: Not on file       Surgical History[3]         Review of Systems   GENERAL: Negative for malaise, significant weight loss, fever  MUSCULOSKELETAL: see HPI  NEURO:  Negative      Exam  Right lower extremity: Passive range of motion of the hip causes pain with terminal internal or external rotation of the hip.    Patient does not have a flexion contracture of the affected hip.    Patient's active range of motion  is limited due to associated pain with motion.    Patient is nontender to palpation about the greater trochanter.  Range of motion of the affected hip from 0 degrees of internal rotation approximate 20 degrees of external [place].  Gait: Patient ambulates with a antalgic gait.  Neuro: Sensation is intact to light touch in the superficial peroneal, deep peroneal, tibial nerve distribution.    5 out of 5 strength in dorsiflexion at the ankle, plantarflexion at the ankle, extension of the big toe.    Patient demonstrates mild weakness in hip flexion and hip extension limited due to pain.    Patient does not demonstrate hip abductor weakness.    Straight leg raise on the affected side does not produce neurologic symptoms.  Vascular: Foot is warm and well perfused.     Imaging  No image results found.         Assessment  Patient with osteoarthritis of the right hip     Plan  Severe right hip DJD- We discussed the options of continuing with conservative care vs proceeding to total hip replacement.  We discussed the roles for resurfacing arthroplasty vs total hip replacement, including both cemented and press-fit techniques.  We reviewed post-operative hip precautions and activity restrictions.  The patient opts for right hip replacement with Pieter assisted posterior approach.     It was explained to the patient that the risks of total hip arthroplasty include both routine operative complications as well as those more specific to total joint arthroplasty.  The patient understands that the potential routine operative complications include but are not limited to infection, wound healing problems, clots in the legs and/or lungs, nerve injury potentially resulting in temporary or permanent loss of feeling and/or weakness in the lower extremity, vascular injury potentially requiring operative intervention, blood loss potentially requiring transfusion with the inherent risks of disease transformation and transfusion reactions, and  anesthetic complications, among others.  The patient also understands that the total hip specific complications include but are not limited to persistent hip pain, greater trochanteric bursitis, limb length discrepancy with the operative side sometimes being longer and sometimes necessitating a lift or build-up in the opposite shoe, hip instability and/or dislocation, limited hip motion, deep infection potentially necessitating operative washout and/or removal of the components, loosening of the prosthesis with the potential need for revision in the future, among others.     Reviewed relative merits of direct anterior versus posterior approach, including slightly higher risk of dislocation with posterior approach, relatively high incidence of lateral thigh numbness with direct anterior approach.    Patient has evidence of chronic end-stage arthritis of the right hip with exacerbation. Based on discussion with the patient as well as review of the patient's previous medical records and notes, the patient has now failed previous nonoperative treatment with activity modification, corticosteroid injections, and assistive devices. Upon personal review of the preoperative imaging, the radiographs demonstrate severe joint space narrowing with subchondral sclerosis and reactive osteophytes.  The patient is felt to be an appropriate candidate for posterior Pieter assisted right hip replacement. The patient will require preoperative medical clearance and preoperative education. Risk stratification has demonstrated the patient will be treated with aspirin for DVT prophylaxis postoperatively. We will have the patient meet with the  to try and find a date and time for the surgery. We have also ordered preoperative laboratory workup to include CBC, BMP, and Albumin to help with medical optimization prior to surgery.    JUSTIFICATION FOR JOINT ARTHROPLASTY    Pain has been present for years  Pain has worsened over the last  few months  Onset of pain was gradual  Quality of the pain is aching  Severity of the pain is severe    LIMITATION OF ADLS  Patient has moderate or severe difficulty getting dressed, using stairs, standing to prepare meals, bathe safely, rising from the toilet, cleaning the home, standing for grocery shopping, walking a city block    MEDICATIONS  Patient has tried NSAIDs, tylenol    INTRA-ARTICULAR INJECTIONS  Patient has not an intra-articular corticosteroid injection which would only provide minimal temporary relief    BMI DISCUSSION  Discussion regarding BMI was undertaken  BMI is 28.15 and appropriate to proceed with hip replacement    SMOKING CESSATION  Non-smoker    PHYSICAL THERAPY AND/OR HOME EXERCISE PLAN    Physical therapy is contraindicated because the patient's disease is such that therapy could cause increased inflammation.    ASSISTIVE DEVICES  Patient uses walker to help with mobility.    SAFETY ISSUES  Patient is at a risk of falling.    PREOPERATIVE IMAGING  X-rays demonstrate bone-on-bone contact    Perioperative considerations:  Patient is 16 months sober after becoming addicted to narcotic medications.  Patient would prefer to have a completely narcotic free total hip replacement and postoperative recovery.  Patient an excellent candidate for the surgery center in that regard.            [1] No past medical history on file.  [2]   Allergies  Allergen Reactions    Penicillin Hives   [3] No past surgical history on file.

## 2025-06-06 PROBLEM — M16.11 UNILATERAL PRIMARY OSTEOARTHRITIS, RIGHT HIP: Status: ACTIVE | Noted: 2025-06-05

## 2025-06-10 ENCOUNTER — APPOINTMENT (OUTPATIENT)
Dept: PRIMARY CARE | Facility: CLINIC | Age: 50
End: 2025-06-10
Payer: MEDICAID

## 2025-06-16 ENCOUNTER — APPOINTMENT (OUTPATIENT)
Dept: PRIMARY CARE | Facility: CLINIC | Age: 50
End: 2025-06-16
Payer: MEDICAID

## 2025-06-19 ENCOUNTER — APPOINTMENT (OUTPATIENT)
Dept: PRIMARY CARE | Facility: CLINIC | Age: 50
End: 2025-06-19
Payer: MEDICAID

## 2025-06-19 VITALS
BODY MASS INDEX: 29.37 KG/M2 | WEIGHT: 172 LBS | OXYGEN SATURATION: 98 % | HEART RATE: 82 BPM | TEMPERATURE: 97.3 F | DIASTOLIC BLOOD PRESSURE: 78 MMHG | HEIGHT: 64 IN | SYSTOLIC BLOOD PRESSURE: 136 MMHG

## 2025-06-19 DIAGNOSIS — Z13.220 SCREENING FOR HYPERLIPIDEMIA: ICD-10-CM

## 2025-06-19 DIAGNOSIS — M16.11 UNILATERAL PRIMARY OSTEOARTHRITIS, RIGHT HIP: ICD-10-CM

## 2025-06-19 DIAGNOSIS — Z00.00 ANNUAL PHYSICAL EXAM: Primary | ICD-10-CM

## 2025-06-19 DIAGNOSIS — Z12.31 ENCOUNTER FOR SCREENING MAMMOGRAM FOR MALIGNANT NEOPLASM OF BREAST: ICD-10-CM

## 2025-06-19 DIAGNOSIS — Z72.0 TOBACCO USE: ICD-10-CM

## 2025-06-19 DIAGNOSIS — E55.9 VITAMIN D DEFICIENCY: ICD-10-CM

## 2025-06-19 DIAGNOSIS — R01.1 SYSTOLIC MURMUR: ICD-10-CM

## 2025-06-19 PROCEDURE — 1036F TOBACCO NON-USER: CPT

## 2025-06-19 PROCEDURE — 3008F BODY MASS INDEX DOCD: CPT

## 2025-06-19 PROCEDURE — 99396 PREV VISIT EST AGE 40-64: CPT

## 2025-06-19 PROCEDURE — 99215 OFFICE O/P EST HI 40 MIN: CPT

## 2025-06-19 ASSESSMENT — PATIENT HEALTH QUESTIONNAIRE - PHQ9
1. LITTLE INTEREST OR PLEASURE IN DOING THINGS: NOT AT ALL
2. FEELING DOWN, DEPRESSED OR HOPELESS: NOT AT ALL
SUM OF ALL RESPONSES TO PHQ9 QUESTIONS 1 AND 2: 0

## 2025-06-19 NOTE — PROGRESS NOTES
"Subjective   Patient ID: Jennifer Ramirez is a 50 y.o. female who presents for New Patient Visit (Pt needs pcp for surgery clearance ...  Pt concern about red/ watery eyes).    HPI  50-year-old female with past medical history of osteoarthritis, history of heroin use presented today to establish primary care.  Patient has no symptoms at today's visit.  Patient's scheduled for right hip replacement in August 2025.  She denies chest pain, shortness of breath, nausea, vomiting, lightheadedness.  Social history: She is a former smoker smoked quit 5 months ago smoked for 40 years history of heroin use she has been sober for 16 months.  Family history: Dad had hypertension and stroke and bladder cancer, mom has hypertension macular degeneration, older sister with thyroid    Review of Systems  As above  Objective   /78 (BP Location: Left arm, Patient Position: Sitting, BP Cuff Size: Large adult)   Pulse 82   Temp 36.3 °C (97.3 °F) (Temporal)   Ht 1.626 m (5' 4\")   Wt 78 kg (172 lb)   SpO2 98%   BMI 29.52 kg/m²     Physical Exam  General: Awake, alert/oriented x4, well developed, no acute distress  Head: Atraumatic/Normocephalic  Eyes: Normal external exam, EOMI, PERRLA  ENT: Oropharynx normal. Uvula midline, no tonsillar edema, moist mucous membranes  Cardiovascular: RRR, S1/S2, systolic murmur no rubs, or gallops, radial pulses +2, no edema of extremities  Pulmonary: CTAB, no respiratory distress. No wheezes, rales, or ronchi  Abdomen: +BS, soft, non-tender, nondistended, no guarding or rebound, no masses noted  MSK: No joint swelling, normal movements of all extremities. Range of motion- normal.  Extremities: FROM, no edema, wounds, or contusions  Skin- No lesions, contusions, or erythema.  Neuro: Alert/oriented x4, no focal motor or sensory deficits  Psychiatric: Judgment intact. Appropriate mood and behavior     Assessment/Plan   Problem List Items Addressed This Visit    None  Visit Diagnoses         Annual " physical exam    -  Primary    Relevant Orders    Vitamin D 25-Hydroxy,Total (for eval of Vitamin D levels)    Tsh With Reflex To Free T4 If Abnormal    Lipid panel    CT lung screening low dose      Tobacco use        Relevant Orders    CT lung screening low dose      Encounter for screening mammogram for malignant neoplasm of breast        Relevant Orders    BI mammo bilateral screening tomosynthesis      Systolic murmur        Relevant Orders    Transthoracic Echo (TTE) Complete      Vitamin D deficiency          Screening for hyperlipidemia                 Patient has blood work and EKG ordered by Ortho.  Ordered the blood work.  Has systolic murmur, ordered an echocardiogram  Healthcare maintenance:  - PHQ-2: 0  - Ordered blood work  - Breast cancer screening: Ordered mammogram  - Lung cancer screening: Order dose CAT scan  - Colon cancer screening: As per patient colonoscopy was done about 7 years ago and was normal.  - Cervical cancer screening: Has not had any Pap smear for years, advised to schedule Pap smear

## 2025-06-24 ENCOUNTER — LAB (OUTPATIENT)
Dept: LAB | Facility: HOSPITAL | Age: 50
End: 2025-06-24
Payer: MEDICAID

## 2025-06-24 LAB
25(OH)D3+25(OH)D2 SERPL-MCNC: 20 NG/ML (ref 30–100)
CHOLEST SERPL-MCNC: 216 MG/DL
CHOLEST/HDLC SERPL: 3.4 (CALC)
HDLC SERPL-MCNC: 64 MG/DL
LDLC SERPL CALC-MCNC: 127 MG/DL (CALC)
NONHDLC SERPL-MCNC: 152 MG/DL (CALC)
TRIGL SERPL-MCNC: 131 MG/DL
TSH SERPL-ACNC: 1.52 MIU/L

## 2025-06-24 PROCEDURE — 83036 HEMOGLOBIN GLYCOSYLATED A1C: CPT

## 2025-06-24 PROCEDURE — 85025 COMPLETE CBC W/AUTO DIFF WBC: CPT

## 2025-06-24 PROCEDURE — 80053 COMPREHEN METABOLIC PANEL: CPT

## 2025-06-24 PROCEDURE — 85730 THROMBOPLASTIN TIME PARTIAL: CPT

## 2025-06-24 PROCEDURE — 85610 PROTHROMBIN TIME: CPT

## 2025-06-25 ENCOUNTER — DOCUMENTATION (OUTPATIENT)
Dept: ORTHOPEDIC SURGERY | Facility: CLINIC | Age: 50
End: 2025-06-25
Payer: MEDICAID

## 2025-07-07 ENCOUNTER — APPOINTMENT (OUTPATIENT)
Facility: HOSPITAL | Age: 50
End: 2025-07-07
Payer: MEDICAID

## 2025-07-07 DIAGNOSIS — Z72.0 TOBACCO USE: ICD-10-CM

## 2025-07-07 DIAGNOSIS — Z00.00 ANNUAL PHYSICAL EXAM: ICD-10-CM

## 2025-07-07 PROCEDURE — 71271 CT THORAX LUNG CANCER SCR C-: CPT | Performed by: RADIOLOGY

## 2025-07-07 PROCEDURE — 71271 CT THORAX LUNG CANCER SCR C-: CPT

## 2025-07-16 ENCOUNTER — TELEPHONE (OUTPATIENT)
Dept: ORTHOPEDIC SURGERY | Facility: CLINIC | Age: 50
End: 2025-07-16
Payer: MEDICAID

## 2025-07-16 NOTE — TELEPHONE ENCOUNTER
07/16/25  Spoke w/ pt and she does have a wheeled walker and will take it to the hospital on the day of sx.

## 2025-07-22 ENCOUNTER — ANCILLARY PROCEDURE (OUTPATIENT)
Dept: CARDIOLOGY | Facility: HOSPITAL | Age: 50
End: 2025-07-22
Payer: MEDICAID

## 2025-07-22 DIAGNOSIS — R01.1 SYSTOLIC MURMUR: ICD-10-CM

## 2025-07-22 PROCEDURE — 2500000004 HC RX 250 GENERAL PHARMACY W/ HCPCS (ALT 636 FOR OP/ED): Mod: JW

## 2025-07-22 PROCEDURE — 93306 TTE W/DOPPLER COMPLETE: CPT | Performed by: INTERNAL MEDICINE

## 2025-07-22 PROCEDURE — C8929 TTE W OR WO FOL WCON,DOPPLER: HCPCS

## 2025-07-22 RX ADMIN — HUMAN ALBUMIN MICROSPHERES AND PERFLUTREN 0.5 ML: 10; .22 INJECTION, SOLUTION INTRAVENOUS at 12:12

## 2025-07-25 LAB
AORTIC VALVE MEAN GRADIENT: 5 MMHG
AORTIC VALVE PEAK VELOCITY: 1.44 M/S
AV PEAK GRADIENT: 8 MMHG
AVA (PEAK VEL): 2.84 CM2
AVA (VTI): 2.15 CM2
EJECTION FRACTION APICAL 4 CHAMBER: 67.2
EJECTION FRACTION: 63 %
LEFT VENTRICLE INTERNAL DIMENSION DIASTOLE: 4.13 CM (ref 3.5–6)
LEFT VENTRICULAR OUTFLOW TRACT DIAMETER: 2.1 CM
LV EJECTION FRACTION BIPLANE: 63 %
MITRAL VALVE E/A RATIO: 1.19
MITRAL VALVE E/E' RATIO: 6.7
RIGHT VENTRICLE FREE WALL PEAK S': 11.5 CM/S
TRICUSPID ANNULAR PLANE SYSTOLIC EXCURSION: 2 CM

## 2025-07-28 ENCOUNTER — ANCILLARY PROCEDURE (OUTPATIENT)
Dept: CARDIOLOGY | Facility: CLINIC | Age: 50
End: 2025-07-28
Payer: MEDICAID

## 2025-07-28 ENCOUNTER — APPOINTMENT (OUTPATIENT)
Dept: PRIMARY CARE | Facility: CLINIC | Age: 50
End: 2025-07-28
Payer: MEDICAID

## 2025-07-28 DIAGNOSIS — Z01.818 PRE-OP EXAMINATION: ICD-10-CM

## 2025-07-28 PROCEDURE — 93000 ELECTROCARDIOGRAM COMPLETE: CPT | Performed by: INTERNAL MEDICINE

## 2025-07-28 NOTE — PROGRESS NOTES
Patient presents to office today for a pre-op EKG visit per Dr. Patel.  Patient pending right hip surgery with Dr. Patel on 8/7/25.  EKG reviewed by in suite provider Dr. Dwain White F.A.C.C.  Patient released from suite.  Poly Peralta, CMA

## 2025-07-29 ENCOUNTER — OFFICE VISIT (OUTPATIENT)
Dept: PRIMARY CARE | Facility: CLINIC | Age: 50
End: 2025-07-29
Payer: MEDICAID

## 2025-07-29 ENCOUNTER — APPOINTMENT (OUTPATIENT)
Dept: PRIMARY CARE | Facility: CLINIC | Age: 50
End: 2025-07-29
Payer: MEDICAID

## 2025-07-29 VITALS
BODY MASS INDEX: 29.71 KG/M2 | SYSTOLIC BLOOD PRESSURE: 130 MMHG | HEART RATE: 99 BPM | WEIGHT: 174 LBS | DIASTOLIC BLOOD PRESSURE: 78 MMHG | TEMPERATURE: 99.9 F | OXYGEN SATURATION: 96 % | HEIGHT: 64 IN

## 2025-07-29 DIAGNOSIS — Z01.818 PRE-OPERATIVE CLEARANCE: ICD-10-CM

## 2025-07-29 DIAGNOSIS — Z72.0 TOBACCO USE: ICD-10-CM

## 2025-07-29 DIAGNOSIS — E78.2 MIXED HYPERLIPIDEMIA: ICD-10-CM

## 2025-07-29 DIAGNOSIS — E55.9 VITAMIN D DEFICIENCY: Primary | ICD-10-CM

## 2025-07-29 PROCEDURE — 99214 OFFICE O/P EST MOD 30 MIN: CPT

## 2025-07-29 PROCEDURE — 3008F BODY MASS INDEX DOCD: CPT

## 2025-07-29 ASSESSMENT — PATIENT HEALTH QUESTIONNAIRE - PHQ9
2. FEELING DOWN, DEPRESSED OR HOPELESS: NOT AT ALL
SUM OF ALL RESPONSES TO PHQ9 QUESTIONS 1 AND 2: 0
1. LITTLE INTEREST OR PLEASURE IN DOING THINGS: NOT AT ALL

## 2025-07-29 NOTE — H&P (VIEW-ONLY)
"Subjective   Patient ID: Jennifer Ramirez is a 50 y.o. female who presents for PAT (MEDICAL CLEARANCE FOR RIGHT TOTAL HIP AT [place] ON 08/07/2025 DR. ROSENBAUM).    HPI   50-year-old female with past medical history of osteoarthritis, history of heroin use presented today for Pre op clearance. Scheduled for right hip replacement next week.   No symptoms today, no hx of perioperative complications. No hx of blood clots    Review of Systems  Chest pain, no shortness of breath, no nausea, no vomiting, no lightheadedness  Objective   /78 (BP Location: Right arm, Patient Position: Sitting, BP Cuff Size: Large adult)   Pulse 99   Temp 37.7 °C (99.9 °F) (Temporal)   Ht 1.626 m (5' 4\")   Wt 78.9 kg (174 lb)   SpO2 96%   BMI 29.87 kg/m²     Physical Exam  General: Awake, alert/oriented x4, well developed, no acute distress, Mallampati score III  Head: Atraumatic/Normocephalic  Eyes: Normal external exam, EOMI, PERRLA  ENT: Oropharynx normal. Uvula midline, no tonsillar edema, moist mucous membranes  Cardiovascular: RRR, S1/S2, systolic murmur no rubs, or gallops, radial pulses +2, no edema of extremities  Pulmonary: CTAB, no respiratory distress. No wheezes, rales, or ronchi  Abdomen: +BS, soft, non-tender, nondistended, no guarding or rebound, no masses noted  MSK: No joint swelling, normal movements of all extremities. Range of motion- normal.  Extremities: FROM, no edema, wounds, or contusions  Skin- No lesions, contusions, or erythema.  Neuro: Alert/oriented x4, no focal motor or sensory deficits  Psychiatric: Judgment intact. Appropriate mood and behavior        Assessment/Plan   Problem List Items Addressed This Visit    None  Visit Diagnoses         Vitamin D deficiency    -  Primary      Mixed hyperlipidemia          Pre-operative clearance          Tobacco use        Relevant Orders    CT lung screening low dose        Reviewed the blood work with the patient.  She had vitamin D deficiency, hyperlipidemia, " ASCVD score is 1.4.  The rest of blood work is normal.  Patient stated that EKG was done yesterday.  Uploaded on epic yet    Lung CT scan was done and showed: dew bilateral nodules, upper lung emohysema. Will repeat in one yr  Echocardiogram was done on was unremarkable.    -EKG was reviewed showed RSR, no ST T changes.   She is cleared for surgery with minimal risk  -Repeat lung CT scan in a yr

## 2025-07-29 NOTE — PROGRESS NOTES
"Subjective   Patient ID: Jennifer Ramirez is a 50 y.o. female who presents for PAT (MEDICAL CLEARANCE FOR RIGHT TOTAL HIP AT [place] ON 08/07/2025 DR. ROSENBAUM).    HPI   50-year-old female with past medical history of osteoarthritis, history of heroin use presented today for Pre op clearance. Scheduled for right hip replacement next week.   No symptoms today, no hx of perioperative complications. No hx of blood clots    Review of Systems  Chest pain, no shortness of breath, no nausea, no vomiting, no lightheadedness  Objective   /78 (BP Location: Right arm, Patient Position: Sitting, BP Cuff Size: Large adult)   Pulse 99   Temp 37.7 °C (99.9 °F) (Temporal)   Ht 1.626 m (5' 4\")   Wt 78.9 kg (174 lb)   SpO2 96%   BMI 29.87 kg/m²     Physical Exam  General: Awake, alert/oriented x4, well developed, no acute distress, Mallampati score III  Head: Atraumatic/Normocephalic  Eyes: Normal external exam, EOMI, PERRLA  ENT: Oropharynx normal. Uvula midline, no tonsillar edema, moist mucous membranes  Cardiovascular: RRR, S1/S2, systolic murmur no rubs, or gallops, radial pulses +2, no edema of extremities  Pulmonary: CTAB, no respiratory distress. No wheezes, rales, or ronchi  Abdomen: +BS, soft, non-tender, nondistended, no guarding or rebound, no masses noted  MSK: No joint swelling, normal movements of all extremities. Range of motion- normal.  Extremities: FROM, no edema, wounds, or contusions  Skin- No lesions, contusions, or erythema.  Neuro: Alert/oriented x4, no focal motor or sensory deficits  Psychiatric: Judgment intact. Appropriate mood and behavior        Assessment/Plan   Problem List Items Addressed This Visit    None  Visit Diagnoses         Vitamin D deficiency    -  Primary      Mixed hyperlipidemia          Pre-operative clearance          Tobacco use        Relevant Orders    CT lung screening low dose        Reviewed the blood work with the patient.  She had vitamin D deficiency, hyperlipidemia, " ASCVD score is 1.4.  The rest of blood work is normal.  Patient stated that EKG was done yesterday.  Uploaded on epic yet    Lung CT scan was done and showed: dew bilateral nodules, upper lung emohysema. Will repeat in one yr  Echocardiogram was done on was unremarkable.  Patient will be cleared for surgery after reviewing EKG    -Repeat lung CT scan in a yr

## 2025-07-30 ENCOUNTER — PHARMACY VISIT (OUTPATIENT)
Dept: PHARMACY | Facility: CLINIC | Age: 50
End: 2025-07-30
Payer: MEDICAID

## 2025-07-30 PROCEDURE — RXMED WILLOW AMBULATORY MEDICATION CHARGE

## 2025-07-30 RX ORDER — CHLORHEXIDINE GLUCONATE 40 MG/ML
SOLUTION TOPICAL DAILY
Qty: 118 ML | Refills: 0 | Status: SHIPPED | OUTPATIENT
Start: 2025-07-30 | End: 2025-08-08 | Stop reason: HOSPADM

## 2025-07-30 RX ORDER — CHLORHEXIDINE GLUCONATE ORAL RINSE 1.2 MG/ML
15 SOLUTION DENTAL DAILY
Qty: 473 ML | Refills: 0 | Status: SHIPPED | OUTPATIENT
Start: 2025-07-30 | End: 2025-08-08 | Stop reason: HOSPADM

## 2025-07-31 ENCOUNTER — PRE-ADMISSION TESTING (OUTPATIENT)
Dept: PREADMISSION TESTING | Facility: HOSPITAL | Age: 50
End: 2025-07-31
Payer: MEDICAID

## 2025-07-31 VITALS
SYSTOLIC BLOOD PRESSURE: 154 MMHG | TEMPERATURE: 97.2 F | RESPIRATION RATE: 20 BRPM | OXYGEN SATURATION: 97 % | WEIGHT: 174.16 LBS | DIASTOLIC BLOOD PRESSURE: 90 MMHG | HEART RATE: 92 BPM | BODY MASS INDEX: 29.73 KG/M2 | HEIGHT: 64 IN

## 2025-07-31 DIAGNOSIS — Z01.818 PREOP TESTING: Primary | ICD-10-CM

## 2025-07-31 LAB
ABO GROUP (TYPE) IN BLOOD: NORMAL
ABO GROUP (TYPE) IN BLOOD: NORMAL
ANTIBODY SCREEN: NORMAL
RH FACTOR (ANTIGEN D): NORMAL
RH FACTOR (ANTIGEN D): NORMAL

## 2025-07-31 PROCEDURE — 86901 BLOOD TYPING SEROLOGIC RH(D): CPT

## 2025-07-31 PROCEDURE — 36415 COLL VENOUS BLD VENIPUNCTURE: CPT

## 2025-07-31 PROCEDURE — 87081 CULTURE SCREEN ONLY: CPT | Mod: PARLAB

## 2025-07-31 RX ORDER — CHOLECALCIFEROL (VITAMIN D3) 25 MCG
25 TABLET ORAL DAILY
COMMUNITY

## 2025-07-31 ASSESSMENT — DUKE ACTIVITY SCORE INDEX (DASI)
TOTAL_SCORE: 12.7
CAN YOU TAKE CARE OF YOURSELF (EAT, DRESS, BATHE, OR USE TOILET): YES
CAN YOU DO MODERATE WORK AROUND THE HOUSE LIKE VACUUMING, SWEEPING FLOORS OR CARRYING GROCERIES: NO
CAN YOU DO YARD WORK LIKE RAKING LEAVES, WEEDING OR PUSHING A MOWER: NO
DASI METS SCORE: 4.3
CAN YOU CLIMB A FLIGHT OF STAIRS OR WALK UP A HILL: YES
CAN YOU WALK A BLOCK OR TWO ON LEVEL GROUND: NO
CAN YOU DO LIGHT WORK AROUND THE HOUSE LIKE DUSTING OR WASHING DISHES: YES
CAN YOU PARTICIPATE IN MODERATE RECREATIONAL ACTIVITIES LIKE GOLF, BOWLING, DANCING, DOUBLES TENNIS OR THROWING A BASEBALL OR FOOTBALL: NO
CAN YOU PARTICIPATE IN STRENOUS SPORTS LIKE SWIMMING, SINGLES TENNIS, FOOTBALL, BASKETBALL, OR SKIING: NO
CAN YOU HAVE SEXUAL RELATIONS: NO
CAN YOU RUN A SHORT DISTANCE: NO
CAN YOU WALK INDOORS, SUCH AS AROUND YOUR HOUSE: YES
CAN YOU DO HEAVY WORK AROUND THE HOUSE LIKE SCRUBBING FLOORS OR LIFTING AND MOVING HEAVY FURNITURE: NO

## 2025-07-31 NOTE — PREPROCEDURE INSTRUCTIONS
One of our staff members will call you one business day before your surgery between 12-4pm to let you know the time to arrive at the hospital. If you have not received a phone call by 2pm call [phone].     When you arrive at the hospital, go to Registration on the ground floor.   You will need a responsible adult to drive you home.     Prior to surgery date:  One (1) week prior to surgery STOP:  -Stop aspirin products. Do not take NSAIDS/ Ibuprofen, Aleve, Motrin. Okay to take Tylenol or Acetaminophen. Do not take vitamins, supplements, herbals, diet pills.   -Stop these medications:   -No alcohol for 24 hours prior to surgery.  -No smoking 24 hours prior. No Marijuana, CBD oil, or Vaping 48 hours prior to surgery.  -Enjoy a light supper the evening before surgery.    Day of Surgery:  -Nothing to eat or drink after midnight. This includes food of any kind (including hard candy, cough drops, mints and gum, coffee).   -Have 13oz of Clear liquids 2hrs prior to Arrival time.   -No acrylic nails or nail polish on at least one fingernail; no polish on toes for foot surgery.   -No body piercing or jewelry.   -Shower as directed. No deodorant, lotion, power, oils, perfume, make-up.  -Mouthwash night before and morning of surgery.   -Wear loose comfortable clothing to accommodate your bandages.     -Bring CPAP machine  -Bring as needed inhalers  -Bring urine specimen if directed  -Bring crutches/ walker if directed     -If  you have questions for PAT please call [phone].      Medication List            Accurate as of July 31, 2025  2:09 PM. Always use your most recent med list.                * chlorhexidine 0.12 % solution  Commonly known as: Peridex  Swish and Spit 15 mL by mouth once daily on the day before surgery and again the morning of surgery.  Medication Adjustments for Surgery: Take/Use as prescribed     * Kae-Hex 4 % external liquid  Generic drug: chlorhexidine  Wash topically daily for 5 days prior to  surgery with day 5 being morning of surgery.  Medication Adjustments for Surgery: Take/Use as prescribed     Vitamin D3 25 mcg (1,000 units) tablet  Generic drug: cholecalciferol  Additional Medication Adjustments for Surgery: Take last dose 7 days before surgery           * This list has 2 medication(s) that are the same as other medications prescribed for you. Read the directions carefully, and ask your doctor or other care provider to review them with you.

## 2025-08-01 ENCOUNTER — APPOINTMENT (OUTPATIENT)
Dept: ORTHOPEDIC SURGERY | Facility: CLINIC | Age: 50
End: 2025-08-01
Payer: MEDICAID

## 2025-08-01 DIAGNOSIS — M16.11 ARTHRITIS OF RIGHT HIP: Primary | ICD-10-CM

## 2025-08-01 PROCEDURE — 99212 OFFICE O/P EST SF 10 MIN: CPT | Performed by: ORTHOPAEDIC SURGERY

## 2025-08-02 LAB — STAPHYLOCOCCUS SPEC CULT: ABNORMAL

## 2025-08-03 NOTE — PROGRESS NOTES
History of Present Illness  Chief Complaint   Patient presents with    Right Hip - Pre-op Visit     ANTONI 8/7/25       Patient here today for final preop visit in regards to right hip arthritis.  Wishes to proceed forth right total hip arthroplasty.    Medical History[1]    Medication Documentation Review Audit       Reviewed by Heaven Davies MA (Medical Assistant) on 08/01/25 at 1302      Medication Order Taking? Sig Documenting Provider Last Dose Status   chlorhexidine (Hibiclens) 4 % external liquid [id number]  Wash topically daily for 5 days prior to surgery with day 5 being morning of surgery. LISSA Bojorquez  Active   chlorhexidine (Peridex) 0.12 % solution [id number]  Swish and Spit 15 mL by mouth once daily on the day before surgery and again the morning of surgery. LISSA Bojorquez  Active   cholecalciferol (Vitamin D3) 25 mcg (1,000 units) tablet [id number]  Take 1 tablet (25 mcg) by mouth once daily. Historical Provider, MD  Active                    RX Allergies[2]    Social History     Socioeconomic History    Marital status:      Spouse name: Not on file    Number of children: Not on file    Years of education: Not on file    Highest education level: Not on file   Occupational History    Not on file   Tobacco Use    Smoking status: Former     Current packs/day: 0.50     Average packs/day: 0.5 packs/day for 15.0 years (7.5 ttl pk-yrs)     Types: Cigarettes     Passive exposure: Never    Smokeless tobacco: Never   Substance and Sexual Activity    Alcohol use: Not Currently    Drug use: Not Currently    Sexual activity: Not Currently     Partners: Male     Birth control/protection: Post-menopausal   Other Topics Concern    Not on file   Social History Narrative    Not on file     Social Drivers of Health     Financial Resource Strain: Not on file   Food Insecurity: Not on file   Transportation Needs: Not on file   Physical Activity: Not on file   Stress: Not on file    Social Connections: Not on file   Intimate Partner Violence: Not on file   Housing Stability: Not on file       Surgical History[3]         Review of Systems   GENERAL: Negative for malaise, significant weight loss, fever  MUSCULOSKELETAL: see HPI  NEURO:  Negative      Exam  General: Patient is alert and orient x 4 and in no acute distress  HEENT: Normocephalic atraumatic extract motions are intact  Lungs: Symmetric chest rise bilaterally with no labored respirations noted  Heart: Regular rate is palpated by the radial artery  Abdomen: Nondistended  Right hip: Skin is clean and dry with no surrounding erythema.     Imaging  ECG 12 Lead  Sinus rhythm, incomplete right bundle branch block.  Rate 87.         Assessment  End-stage arthritis of the right hip     Plan  Patient here today for final assessment regards to right total hip replacement.  Patient has undergone appropriate preoperative workup and plan to proceed with right anterior total hip replacement the following week.  Per patient's wishes we will not be utilizing any narcotic medication after surgery in an effort to protect her sobriety.          [1]   Past Medical History:  Diagnosis Date    Allergic     Arthritis     HL (hearing loss)     Substance abuse     Vision loss     Visual impairment    [2]   Allergies  Allergen Reactions    Penicillin Hives   [3]   Past Surgical History:  Procedure Laterality Date    COLONOSCOPY      TONSILLECTOMY      TUBAL LIGATION      UPPER GASTROINTESTINAL ENDOSCOPY

## 2025-08-06 ENCOUNTER — DOCUMENTATION (OUTPATIENT)
Dept: ORTHOPEDIC SURGERY | Facility: CLINIC | Age: 50
End: 2025-08-06
Payer: MEDICAID

## 2025-08-06 NOTE — PROGRESS NOTES
Contacted Pt per Dr. Patel, Pt was advised to  OTC betadine swabs to swab her nose daily before Sx.

## 2025-08-07 ENCOUNTER — HOSPITAL ENCOUNTER (OUTPATIENT)
Facility: HOSPITAL | Age: 50
Discharge: HOME HEALTH | End: 2025-08-08
Attending: ORTHOPAEDIC SURGERY | Admitting: ORTHOPAEDIC SURGERY
Payer: MEDICAID

## 2025-08-07 ENCOUNTER — APPOINTMENT (OUTPATIENT)
Dept: RADIOLOGY | Facility: HOSPITAL | Age: 50
End: 2025-08-07
Payer: MEDICAID

## 2025-08-07 ENCOUNTER — ANESTHESIA EVENT (OUTPATIENT)
Dept: OPERATING ROOM | Facility: HOSPITAL | Age: 50
End: 2025-08-07
Payer: MEDICAID

## 2025-08-07 ENCOUNTER — ANESTHESIA (OUTPATIENT)
Dept: OPERATING ROOM | Facility: HOSPITAL | Age: 50
End: 2025-08-07
Payer: MEDICAID

## 2025-08-07 DIAGNOSIS — M16.11 UNILATERAL PRIMARY OSTEOARTHRITIS, RIGHT HIP: ICD-10-CM

## 2025-08-07 PROBLEM — Z96.641 S/P TOTAL RIGHT HIP ARTHROPLASTY: Status: ACTIVE | Noted: 2025-08-07

## 2025-08-07 LAB — PREGNANCY TEST URINE, POC: NEGATIVE

## 2025-08-07 PROCEDURE — 3700000002 HC GENERAL ANESTHESIA TIME - EACH INCREMENTAL 1 MINUTE: Performed by: ORTHOPAEDIC SURGERY

## 2025-08-07 PROCEDURE — 3600000017 HC OR TIME - EACH INCREMENTAL 1 MINUTE - PROCEDURE LEVEL SIX: Performed by: ORTHOPAEDIC SURGERY

## 2025-08-07 PROCEDURE — 2780000003 HC OR 278 NO HCPCS: Performed by: ORTHOPAEDIC SURGERY

## 2025-08-07 PROCEDURE — 2500000004 HC RX 250 GENERAL PHARMACY W/ HCPCS (ALT 636 FOR OP/ED): Mod: JZ | Performed by: ORTHOPAEDIC SURGERY

## 2025-08-07 PROCEDURE — 2500000001 HC RX 250 WO HCPCS SELF ADMINISTERED DRUGS (ALT 637 FOR MEDICARE OP): Performed by: ANESTHESIOLOGY

## 2025-08-07 PROCEDURE — 2500000004 HC RX 250 GENERAL PHARMACY W/ HCPCS (ALT 636 FOR OP/ED): Performed by: ANESTHESIOLOGY

## 2025-08-07 PROCEDURE — 27130 TOTAL HIP ARTHROPLASTY: CPT | Performed by: ORTHOPAEDIC SURGERY

## 2025-08-07 PROCEDURE — 88311 DECALCIFY TISSUE: CPT | Performed by: PATHOLOGY

## 2025-08-07 PROCEDURE — 2500000001 HC RX 250 WO HCPCS SELF ADMINISTERED DRUGS (ALT 637 FOR MEDICARE OP): Performed by: ORTHOPAEDIC SURGERY

## 2025-08-07 PROCEDURE — 7100000002 HC RECOVERY ROOM TIME - EACH INCREMENTAL 1 MINUTE: Performed by: ORTHOPAEDIC SURGERY

## 2025-08-07 PROCEDURE — C1713 ANCHOR/SCREW BN/BN,TIS/BN: HCPCS | Performed by: ORTHOPAEDIC SURGERY

## 2025-08-07 PROCEDURE — 72170 X-RAY EXAM OF PELVIS: CPT | Performed by: RADIOLOGY

## 2025-08-07 PROCEDURE — 2700000047 HC OR 270 NO HCPCS: Performed by: ORTHOPAEDIC SURGERY

## 2025-08-07 PROCEDURE — A6213 FOAM DRG >16<=48 SQ IN W/BDR: HCPCS | Performed by: ORTHOPAEDIC SURGERY

## 2025-08-07 PROCEDURE — 7100000011 HC EXTENDED STAY RECOVERY HOURLY - NURSING UNIT

## 2025-08-07 PROCEDURE — C1776 JOINT DEVICE (IMPLANTABLE): HCPCS | Performed by: ORTHOPAEDIC SURGERY

## 2025-08-07 PROCEDURE — 7100000001 HC RECOVERY ROOM TIME - INITIAL BASE CHARGE: Performed by: ORTHOPAEDIC SURGERY

## 2025-08-07 PROCEDURE — 76000 FLUOROSCOPY <1 HR PHYS/QHP: CPT

## 2025-08-07 PROCEDURE — 88311 DECALCIFY TISSUE: CPT | Mod: TC,PARLAB | Performed by: ORTHOPAEDIC SURGERY

## 2025-08-07 PROCEDURE — 2720000007 HC OR 272 NO HCPCS: Performed by: ORTHOPAEDIC SURGERY

## 2025-08-07 PROCEDURE — 97161 PT EVAL LOW COMPLEX 20 MIN: CPT | Mod: GP

## 2025-08-07 PROCEDURE — 97165 OT EVAL LOW COMPLEX 30 MIN: CPT | Mod: GO

## 2025-08-07 PROCEDURE — 3600000018 HC OR TIME - INITIAL BASE CHARGE - PROCEDURE LEVEL SIX: Performed by: ORTHOPAEDIC SURGERY

## 2025-08-07 PROCEDURE — 3700000001 HC GENERAL ANESTHESIA TIME - INITIAL BASE CHARGE: Performed by: ORTHOPAEDIC SURGERY

## 2025-08-07 PROCEDURE — 72170 X-RAY EXAM OF PELVIS: CPT

## 2025-08-07 PROCEDURE — 2500000004 HC RX 250 GENERAL PHARMACY W/ HCPCS (ALT 636 FOR OP/ED)

## 2025-08-07 PROCEDURE — 2500000005 HC RX 250 GENERAL PHARMACY W/O HCPCS: Performed by: ORTHOPAEDIC SURGERY

## 2025-08-07 PROCEDURE — 81025 URINE PREGNANCY TEST: CPT | Performed by: ORTHOPAEDIC SURGERY

## 2025-08-07 PROCEDURE — 88304 TISSUE EXAM BY PATHOLOGIST: CPT | Performed by: PATHOLOGY

## 2025-08-07 DEVICE — SHELL, TRIDENT II, CLUSTERHOLE, 56F: Type: IMPLANTABLE DEVICE | Site: HIP | Status: FUNCTIONAL

## 2025-08-07 DEVICE — HIP STEM - STANDARD OFFSET
Type: IMPLANTABLE DEVICE | Site: HIP | Status: FUNCTIONAL
Brand: INSIGNIA

## 2025-08-07 DEVICE — TRIDENT X3 0 DEGREE POLYETHYLENE INSERT
Type: IMPLANTABLE DEVICE | Site: HIP | Status: FUNCTIONAL
Brand: TRIDENT X3 INSERT

## 2025-08-07 DEVICE — CERAMIC V40 FEMORAL HEAD
Type: IMPLANTABLE DEVICE | Site: HIP | Status: FUNCTIONAL
Brand: BIOLOX

## 2025-08-07 DEVICE — 6.5MM LOW PROFILE HEX SCREW 20MM
Type: IMPLANTABLE DEVICE | Site: HIP | Status: FUNCTIONAL
Brand: TRIDENT II

## 2025-08-07 RX ORDER — ASPIRIN 81 MG/1
81 TABLET ORAL 2 TIMES DAILY
Status: DISCONTINUED | OUTPATIENT
Start: 2025-08-07 | End: 2025-08-08 | Stop reason: HOSPADM

## 2025-08-07 RX ORDER — FENTANYL CITRATE 50 UG/ML
100 INJECTION, SOLUTION INTRAMUSCULAR; INTRAVENOUS ONCE
Status: COMPLETED | OUTPATIENT
Start: 2025-08-07 | End: 2025-08-07

## 2025-08-07 RX ORDER — BISACODYL 10 MG/1
10 SUPPOSITORY RECTAL DAILY PRN
Status: DISCONTINUED | OUTPATIENT
Start: 2025-08-07 | End: 2025-08-08 | Stop reason: HOSPADM

## 2025-08-07 RX ORDER — TRANEXAMIC ACID 10 MG/ML
INJECTION, SOLUTION INTRAVENOUS AS NEEDED
Status: DISCONTINUED | OUTPATIENT
Start: 2025-08-07 | End: 2025-08-07

## 2025-08-07 RX ORDER — ACETAMINOPHEN 325 MG/1
650 TABLET ORAL EVERY 4 HOURS PRN
Status: DISCONTINUED | OUTPATIENT
Start: 2025-08-07 | End: 2025-08-07 | Stop reason: HOSPADM

## 2025-08-07 RX ORDER — LIDOCAINE HYDROCHLORIDE 10 MG/ML
0.1 INJECTION, SOLUTION INFILTRATION; PERINEURAL ONCE
Status: DISCONTINUED | OUTPATIENT
Start: 2025-08-07 | End: 2025-08-07 | Stop reason: HOSPADM

## 2025-08-07 RX ORDER — CYCLOBENZAPRINE HCL 10 MG
10 TABLET ORAL 3 TIMES DAILY PRN
Status: DISCONTINUED | OUTPATIENT
Start: 2025-08-07 | End: 2025-08-08 | Stop reason: HOSPADM

## 2025-08-07 RX ORDER — ALBUTEROL SULFATE 0.83 MG/ML
2.5 SOLUTION RESPIRATORY (INHALATION) ONCE AS NEEDED
Status: DISCONTINUED | OUTPATIENT
Start: 2025-08-07 | End: 2025-08-07 | Stop reason: HOSPADM

## 2025-08-07 RX ORDER — DIPHENHYDRAMINE HYDROCHLORIDE 50 MG/ML
12.5 INJECTION, SOLUTION INTRAMUSCULAR; INTRAVENOUS EVERY 6 HOURS PRN
Status: DISCONTINUED | OUTPATIENT
Start: 2025-08-07 | End: 2025-08-08 | Stop reason: HOSPADM

## 2025-08-07 RX ORDER — PROPOFOL 10 MG/ML
INJECTION, EMULSION INTRAVENOUS AS NEEDED
Status: DISCONTINUED | OUTPATIENT
Start: 2025-08-07 | End: 2025-08-07

## 2025-08-07 RX ORDER — FENTANYL CITRATE 50 UG/ML
INJECTION, SOLUTION INTRAMUSCULAR; INTRAVENOUS AS NEEDED
Status: DISCONTINUED | OUTPATIENT
Start: 2025-08-07 | End: 2025-08-07

## 2025-08-07 RX ORDER — PROMETHAZINE HYDROCHLORIDE 25 MG/1
25 SUPPOSITORY RECTAL EVERY 12 HOURS PRN
Status: DISCONTINUED | OUTPATIENT
Start: 2025-08-07 | End: 2025-08-08 | Stop reason: HOSPADM

## 2025-08-07 RX ORDER — SODIUM CHLORIDE 0.9 G/100ML
INJECTION, SOLUTION IRRIGATION AS NEEDED
Status: DISCONTINUED | OUTPATIENT
Start: 2025-08-07 | End: 2025-08-07 | Stop reason: HOSPADM

## 2025-08-07 RX ORDER — IPRATROPIUM BROMIDE 0.5 MG/2.5ML
500 SOLUTION RESPIRATORY (INHALATION) ONCE
Status: DISCONTINUED | OUTPATIENT
Start: 2025-08-07 | End: 2025-08-07 | Stop reason: HOSPADM

## 2025-08-07 RX ORDER — MIDAZOLAM HYDROCHLORIDE 1 MG/ML
INJECTION, SOLUTION INTRAMUSCULAR; INTRAVENOUS AS NEEDED
Status: DISCONTINUED | OUTPATIENT
Start: 2025-08-07 | End: 2025-08-07

## 2025-08-07 RX ORDER — ONDANSETRON HYDROCHLORIDE 2 MG/ML
INJECTION, SOLUTION INTRAVENOUS AS NEEDED
Status: DISCONTINUED | OUTPATIENT
Start: 2025-08-07 | End: 2025-08-07

## 2025-08-07 RX ORDER — CEFAZOLIN SODIUM 2 G/50ML
2 SOLUTION INTRAVENOUS EVERY 8 HOURS
Status: COMPLETED | OUTPATIENT
Start: 2025-08-07 | End: 2025-08-08

## 2025-08-07 RX ORDER — TRAMADOL HYDROCHLORIDE 50 MG/1
50 TABLET, FILM COATED ORAL ONCE
Status: COMPLETED | OUTPATIENT
Start: 2025-08-07 | End: 2025-08-07

## 2025-08-07 RX ORDER — CEFAZOLIN SODIUM 2 G/50ML
2 SOLUTION INTRAVENOUS ONCE
Status: COMPLETED | OUTPATIENT
Start: 2025-08-07 | End: 2025-08-07

## 2025-08-07 RX ORDER — POLYETHYLENE GLYCOL 3350 17 G/17G
17 POWDER, FOR SOLUTION ORAL DAILY
Status: DISCONTINUED | OUTPATIENT
Start: 2025-08-07 | End: 2025-08-08 | Stop reason: HOSPADM

## 2025-08-07 RX ORDER — DIPHENHYDRAMINE HCL 12.5MG/5ML
12.5 LIQUID (ML) ORAL EVERY 6 HOURS PRN
Status: DISCONTINUED | OUTPATIENT
Start: 2025-08-07 | End: 2025-08-08 | Stop reason: HOSPADM

## 2025-08-07 RX ORDER — DEXAMETHASONE SODIUM PHOSPHATE 10 MG/ML
6 INJECTION INTRAMUSCULAR; INTRAVENOUS ONCE
Status: DISCONTINUED | OUTPATIENT
Start: 2025-08-07 | End: 2025-08-07 | Stop reason: HOSPADM

## 2025-08-07 RX ORDER — ACETAMINOPHEN 325 MG/1
650 TABLET ORAL ONCE
Status: COMPLETED | OUTPATIENT
Start: 2025-08-07 | End: 2025-08-07

## 2025-08-07 RX ORDER — DIPHENHYDRAMINE HYDROCHLORIDE 50 MG/ML
12.5 INJECTION, SOLUTION INTRAMUSCULAR; INTRAVENOUS ONCE AS NEEDED
Status: DISCONTINUED | OUTPATIENT
Start: 2025-08-07 | End: 2025-08-07 | Stop reason: HOSPADM

## 2025-08-07 RX ORDER — PROMETHAZINE HYDROCHLORIDE 25 MG/1
25 TABLET ORAL EVERY 6 HOURS PRN
Status: DISCONTINUED | OUTPATIENT
Start: 2025-08-07 | End: 2025-08-08 | Stop reason: HOSPADM

## 2025-08-07 RX ORDER — CELECOXIB 100 MG/1
400 CAPSULE ORAL ONCE
Status: COMPLETED | OUTPATIENT
Start: 2025-08-07 | End: 2025-08-07

## 2025-08-07 RX ORDER — GABAPENTIN 300 MG/1
600 CAPSULE ORAL ONCE
Status: COMPLETED | OUTPATIENT
Start: 2025-08-07 | End: 2025-08-07

## 2025-08-07 RX ORDER — BISACODYL 5 MG
10 TABLET, DELAYED RELEASE (ENTERIC COATED) ORAL DAILY PRN
Status: DISCONTINUED | OUTPATIENT
Start: 2025-08-07 | End: 2025-08-08 | Stop reason: HOSPADM

## 2025-08-07 RX ORDER — SODIUM CHLORIDE, SODIUM LACTATE, POTASSIUM CHLORIDE, CALCIUM CHLORIDE 600; 310; 30; 20 MG/100ML; MG/100ML; MG/100ML; MG/100ML
100 INJECTION, SOLUTION INTRAVENOUS CONTINUOUS
Status: DISCONTINUED | OUTPATIENT
Start: 2025-08-07 | End: 2025-08-07 | Stop reason: HOSPADM

## 2025-08-07 RX ORDER — ONDANSETRON HYDROCHLORIDE 2 MG/ML
4 INJECTION, SOLUTION INTRAVENOUS EVERY 8 HOURS PRN
Status: DISCONTINUED | OUTPATIENT
Start: 2025-08-07 | End: 2025-08-08 | Stop reason: HOSPADM

## 2025-08-07 RX ORDER — ROPIVACAINE/EPI/CLONIDINE/KET 2.46-0.005
SYRINGE (ML) INJECTION AS NEEDED
Status: DISCONTINUED | OUTPATIENT
Start: 2025-08-07 | End: 2025-08-07 | Stop reason: HOSPADM

## 2025-08-07 RX ORDER — SODIUM CHLORIDE, SODIUM LACTATE, POTASSIUM CHLORIDE, CALCIUM CHLORIDE 600; 310; 30; 20 MG/100ML; MG/100ML; MG/100ML; MG/100ML
50 INJECTION, SOLUTION INTRAVENOUS CONTINUOUS
Status: DISCONTINUED | OUTPATIENT
Start: 2025-08-07 | End: 2025-08-08 | Stop reason: HOSPADM

## 2025-08-07 RX ORDER — LIDOCAINE HCL/PF 100 MG/5ML
SYRINGE (ML) INTRAVENOUS AS NEEDED
Status: DISCONTINUED | OUTPATIENT
Start: 2025-08-07 | End: 2025-08-07

## 2025-08-07 RX ORDER — GABAPENTIN 300 MG/1
600 CAPSULE ORAL ONCE
Status: DISCONTINUED | OUTPATIENT
Start: 2025-08-07 | End: 2025-08-07 | Stop reason: SDUPTHER

## 2025-08-07 RX ORDER — SCOPOLAMINE 1 MG/3D
1 PATCH, EXTENDED RELEASE TRANSDERMAL ONCE
Status: DISCONTINUED | OUTPATIENT
Start: 2025-08-07 | End: 2025-08-07 | Stop reason: HOSPADM

## 2025-08-07 RX ORDER — ONDANSETRON 4 MG/1
4 TABLET, ORALLY DISINTEGRATING ORAL EVERY 8 HOURS PRN
Status: DISCONTINUED | OUTPATIENT
Start: 2025-08-07 | End: 2025-08-08 | Stop reason: HOSPADM

## 2025-08-07 RX ORDER — MIDAZOLAM HYDROCHLORIDE 1 MG/ML
4 INJECTION, SOLUTION INTRAMUSCULAR; INTRAVENOUS ONCE
Status: COMPLETED | OUTPATIENT
Start: 2025-08-07 | End: 2025-08-07

## 2025-08-07 RX ORDER — KETOROLAC TROMETHAMINE 30 MG/ML
15 INJECTION, SOLUTION INTRAMUSCULAR; INTRAVENOUS EVERY 6 HOURS
Status: COMPLETED | OUTPATIENT
Start: 2025-08-07 | End: 2025-08-08

## 2025-08-07 RX ORDER — PROCHLORPERAZINE EDISYLATE 5 MG/ML
5 INJECTION INTRAMUSCULAR; INTRAVENOUS ONCE AS NEEDED
Status: DISCONTINUED | OUTPATIENT
Start: 2025-08-07 | End: 2025-08-07 | Stop reason: HOSPADM

## 2025-08-07 RX ORDER — ACETAMINOPHEN 325 MG/1
650 TABLET ORAL EVERY 6 HOURS SCHEDULED
Status: DISCONTINUED | OUTPATIENT
Start: 2025-08-07 | End: 2025-08-08 | Stop reason: HOSPADM

## 2025-08-07 RX ORDER — ONDANSETRON HYDROCHLORIDE 2 MG/ML
4 INJECTION, SOLUTION INTRAVENOUS ONCE AS NEEDED
Status: DISCONTINUED | OUTPATIENT
Start: 2025-08-07 | End: 2025-08-07 | Stop reason: HOSPADM

## 2025-08-07 RX ORDER — ROCURONIUM BROMIDE 10 MG/ML
INJECTION, SOLUTION INTRAVENOUS AS NEEDED
Status: DISCONTINUED | OUTPATIENT
Start: 2025-08-07 | End: 2025-08-07

## 2025-08-07 RX ADMIN — MIDAZOLAM 2 MG: 1 INJECTION INTRAMUSCULAR; INTRAVENOUS at 10:55

## 2025-08-07 RX ADMIN — PROPOFOL 150 MG: 10 INJECTION, EMULSION INTRAVENOUS at 10:58

## 2025-08-07 RX ADMIN — ROCURONIUM BROMIDE 50 MG: 10 INJECTION, SOLUTION INTRAVENOUS at 10:58

## 2025-08-07 RX ADMIN — FENTANYL CITRATE 50 MCG: 50 INJECTION, SOLUTION INTRAMUSCULAR; INTRAVENOUS at 11:34

## 2025-08-07 RX ADMIN — TRANEXAMIC ACID 1000 MG: 10 INJECTION, SOLUTION INTRAVENOUS at 12:24

## 2025-08-07 RX ADMIN — PROPOFOL 50 MG: 10 INJECTION, EMULSION INTRAVENOUS at 11:13

## 2025-08-07 RX ADMIN — ONDANSETRON 4 MG: 2 INJECTION INTRAMUSCULAR; INTRAVENOUS at 12:14

## 2025-08-07 RX ADMIN — POLYETHYLENE GLYCOL 3350 17 G: 17 POWDER, FOR SOLUTION ORAL at 21:08

## 2025-08-07 RX ADMIN — SUGAMMADEX 160 MG: 100 INJECTION, SOLUTION INTRAVENOUS at 12:41

## 2025-08-07 RX ADMIN — KETOROLAC TROMETHAMINE 15 MG: 30 INJECTION, SOLUTION INTRAMUSCULAR at 21:08

## 2025-08-07 RX ADMIN — ACETAMINOPHEN 650 MG: 325 TABLET ORAL at 18:11

## 2025-08-07 RX ADMIN — FENTANYL CITRATE 100 MCG: 50 INJECTION INTRAMUSCULAR; INTRAVENOUS at 09:20

## 2025-08-07 RX ADMIN — MIDAZOLAM 4 MG: 1 INJECTION INTRAMUSCULAR; INTRAVENOUS at 09:23

## 2025-08-07 RX ADMIN — GABAPENTIN 600 MG: 300 CAPSULE ORAL at 08:57

## 2025-08-07 RX ADMIN — FENTANYL CITRATE 50 MCG: 50 INJECTION, SOLUTION INTRAMUSCULAR; INTRAVENOUS at 10:55

## 2025-08-07 RX ADMIN — FENTANYL CITRATE 50 MCG: 50 INJECTION, SOLUTION INTRAMUSCULAR; INTRAVENOUS at 10:58

## 2025-08-07 RX ADMIN — SODIUM CHLORIDE, POTASSIUM CHLORIDE, SODIUM LACTATE AND CALCIUM CHLORIDE: 600; 310; 30; 20 INJECTION, SOLUTION INTRAVENOUS at 10:51

## 2025-08-07 RX ADMIN — CEFAZOLIN SODIUM 2 G: 2 SOLUTION INTRAVENOUS at 18:11

## 2025-08-07 RX ADMIN — FENTANYL CITRATE 50 MCG: 50 INJECTION, SOLUTION INTRAMUSCULAR; INTRAVENOUS at 12:59

## 2025-08-07 RX ADMIN — CEFAZOLIN SODIUM 2 G: 2 SOLUTION INTRAVENOUS at 10:52

## 2025-08-07 RX ADMIN — ACETAMINOPHEN 650 MG: 325 TABLET ORAL at 08:57

## 2025-08-07 RX ADMIN — KETOROLAC TROMETHAMINE 15 MG: 30 INJECTION, SOLUTION INTRAMUSCULAR at 16:14

## 2025-08-07 RX ADMIN — TRAMADOL HYDROCHLORIDE 50 MG: 50 TABLET, COATED ORAL at 08:57

## 2025-08-07 RX ADMIN — LIDOCAINE HYDROCHLORIDE 80 MG: 20 INJECTION INTRAVENOUS at 10:58

## 2025-08-07 RX ADMIN — CELECOXIB 400 MG: 100 CAPSULE ORAL at 08:57

## 2025-08-07 RX ADMIN — POVIDONE-IODINE 1 APPLICATION: 5 SOLUTION TOPICAL at 09:40

## 2025-08-07 RX ADMIN — FENTANYL CITRATE 50 MCG: 50 INJECTION, SOLUTION INTRAMUSCULAR; INTRAVENOUS at 12:50

## 2025-08-07 RX ADMIN — SODIUM CHLORIDE, SODIUM LACTATE, POTASSIUM CHLORIDE, AND CALCIUM CHLORIDE 50 ML/HR: .6; .31; .03; .02 INJECTION, SOLUTION INTRAVENOUS at 16:15

## 2025-08-07 RX ADMIN — DEXAMETHASONE SODIUM PHOSPHATE 4 MG: 4 INJECTION, SOLUTION INTRAMUSCULAR; INTRAVENOUS at 11:17

## 2025-08-07 RX ADMIN — TRANEXAMIC ACID 1000 MG: 10 INJECTION, SOLUTION INTRAVENOUS at 11:06

## 2025-08-07 SDOH — SOCIAL STABILITY: SOCIAL INSECURITY: WITHIN THE LAST YEAR, HAVE YOU BEEN HUMILIATED OR EMOTIONALLY ABUSED IN OTHER WAYS BY YOUR PARTNER OR EX-PARTNER?: NO

## 2025-08-07 SDOH — SOCIAL STABILITY: SOCIAL INSECURITY: ABUSE: ADULT

## 2025-08-07 SDOH — ECONOMIC STABILITY: INCOME INSECURITY: IN THE PAST 12 MONTHS HAS THE ELECTRIC, GAS, OIL, OR WATER COMPANY THREATENED TO SHUT OFF SERVICES IN YOUR HOME?: NO

## 2025-08-07 SDOH — ECONOMIC STABILITY: FOOD INSECURITY: WITHIN THE PAST 12 MONTHS, YOU WORRIED THAT YOUR FOOD WOULD RUN OUT BEFORE YOU GOT THE MONEY TO BUY MORE.: NEVER TRUE

## 2025-08-07 SDOH — SOCIAL STABILITY: SOCIAL INSECURITY: HAVE YOU HAD THOUGHTS OF HARMING ANYONE ELSE?: NO

## 2025-08-07 SDOH — ECONOMIC STABILITY: FOOD INSECURITY: WITHIN THE PAST 12 MONTHS, THE FOOD YOU BOUGHT JUST DIDN'T LAST AND YOU DIDN'T HAVE MONEY TO GET MORE.: NEVER TRUE

## 2025-08-07 SDOH — SOCIAL STABILITY: SOCIAL INSECURITY: WITHIN THE LAST YEAR, HAVE YOU BEEN AFRAID OF YOUR PARTNER OR EX-PARTNER?: NO

## 2025-08-07 SDOH — ECONOMIC STABILITY: HOUSING INSECURITY: IN THE PAST 12 MONTHS, HOW MANY TIMES HAVE YOU MOVED WHERE YOU WERE LIVING?: 0

## 2025-08-07 SDOH — SOCIAL STABILITY: SOCIAL INSECURITY: DO YOU FEEL ANYONE HAS EXPLOITED OR TAKEN ADVANTAGE OF YOU FINANCIALLY OR OF YOUR PERSONAL PROPERTY?: NO

## 2025-08-07 SDOH — ECONOMIC STABILITY: HOUSING INSECURITY: IN THE LAST 12 MONTHS, WAS THERE A TIME WHEN YOU WERE NOT ABLE TO PAY THE MORTGAGE OR RENT ON TIME?: NO

## 2025-08-07 SDOH — SOCIAL STABILITY: SOCIAL INSECURITY: HAS ANYONE EVER THREATENED TO HURT YOUR FAMILY OR YOUR PETS?: NO

## 2025-08-07 SDOH — SOCIAL STABILITY: SOCIAL INSECURITY
WITHIN THE LAST YEAR, HAVE YOU BEEN RAPED OR FORCED TO HAVE ANY KIND OF SEXUAL ACTIVITY BY YOUR PARTNER OR EX-PARTNER?: NO

## 2025-08-07 SDOH — SOCIAL STABILITY: SOCIAL INSECURITY
WITHIN THE LAST YEAR, HAVE YOU BEEN KICKED, HIT, SLAPPED, OR OTHERWISE PHYSICALLY HURT BY YOUR PARTNER OR EX-PARTNER?: NO

## 2025-08-07 SDOH — ECONOMIC STABILITY: TRANSPORTATION INSECURITY: IN THE PAST 12 MONTHS, HAS LACK OF TRANSPORTATION KEPT YOU FROM MEDICAL APPOINTMENTS OR FROM GETTING MEDICATIONS?: NO

## 2025-08-07 SDOH — SOCIAL STABILITY: SOCIAL INSECURITY: ARE THERE ANY APPARENT SIGNS OF INJURIES/BEHAVIORS THAT COULD BE RELATED TO ABUSE/NEGLECT?: NO

## 2025-08-07 SDOH — HEALTH STABILITY: MENTAL HEALTH: CURRENT SMOKER: 1

## 2025-08-07 SDOH — SOCIAL STABILITY: SOCIAL INSECURITY: DO YOU FEEL UNSAFE GOING BACK TO THE PLACE WHERE YOU ARE LIVING?: NO

## 2025-08-07 SDOH — ECONOMIC STABILITY: HOUSING INSECURITY: AT ANY TIME IN THE PAST 12 MONTHS, WERE YOU HOMELESS OR LIVING IN A SHELTER (INCLUDING NOW)?: NO

## 2025-08-07 SDOH — ECONOMIC STABILITY: FOOD INSECURITY: HOW HARD IS IT FOR YOU TO PAY FOR THE VERY BASICS LIKE FOOD, HOUSING, MEDICAL CARE, AND HEATING?: NOT HARD AT ALL

## 2025-08-07 SDOH — SOCIAL STABILITY: SOCIAL INSECURITY: ARE YOU OR HAVE YOU BEEN THREATENED OR ABUSED PHYSICALLY, EMOTIONALLY, OR SEXUALLY BY ANYONE?: NO

## 2025-08-07 SDOH — SOCIAL STABILITY: SOCIAL INSECURITY: DOES ANYONE TRY TO KEEP YOU FROM HAVING/CONTACTING OTHER FRIENDS OR DOING THINGS OUTSIDE YOUR HOME?: NO

## 2025-08-07 SDOH — SOCIAL STABILITY: SOCIAL INSECURITY: WERE YOU ABLE TO COMPLETE ALL THE BEHAVIORAL HEALTH SCREENINGS?: YES

## 2025-08-07 ASSESSMENT — LIFESTYLE VARIABLES
SKIP TO QUESTIONS 9-10: 1
HOW OFTEN DO YOU HAVE A DRINK CONTAINING ALCOHOL: NEVER
AUDIT-C TOTAL SCORE: 0
HOW OFTEN DO YOU HAVE 6 OR MORE DRINKS ON ONE OCCASION: NEVER
AUDIT-C TOTAL SCORE: 0
HOW MANY STANDARD DRINKS CONTAINING ALCOHOL DO YOU HAVE ON A TYPICAL DAY: PATIENT DOES NOT DRINK

## 2025-08-07 ASSESSMENT — COGNITIVE AND FUNCTIONAL STATUS - GENERAL
CLIMB 3 TO 5 STEPS WITH RAILING: A LITTLE
MOVING FROM LYING ON BACK TO SITTING ON SIDE OF FLAT BED WITH BEDRAILS: A LITTLE
DAILY ACTIVITIY SCORE: 19
PERSONAL GROOMING: A LITTLE
TURNING FROM BACK TO SIDE WHILE IN FLAT BAD: A LITTLE
HELP NEEDED FOR BATHING: A LITTLE
MOVING TO AND FROM BED TO CHAIR: A LITTLE
MOVING FROM LYING ON BACK TO SITTING ON SIDE OF FLAT BED WITH BEDRAILS: A LITTLE
MOBILITY SCORE: 18
STANDING UP FROM CHAIR USING ARMS: A LITTLE
MOVING TO AND FROM BED TO CHAIR: A LITTLE
DRESSING REGULAR UPPER BODY CLOTHING: A LITTLE
DRESSING REGULAR LOWER BODY CLOTHING: A LITTLE
TOILETING: A LITTLE
DAILY ACTIVITIY SCORE: 22
PATIENT BASELINE BEDBOUND: NO
WALKING IN HOSPITAL ROOM: A LOT
MOBILITY SCORE: 15
TOILETING: A LITTLE
CLIMB 3 TO 5 STEPS WITH RAILING: TOTAL
STANDING UP FROM CHAIR USING ARMS: A LITTLE
DRESSING REGULAR LOWER BODY CLOTHING: A LITTLE
WALKING IN HOSPITAL ROOM: A LITTLE
TURNING FROM BACK TO SIDE WHILE IN FLAT BAD: A LITTLE

## 2025-08-07 ASSESSMENT — PAIN SCALES - GENERAL
PAINLEVEL_OUTOF10: 0 - NO PAIN
PAINLEVEL_OUTOF10: 0 - NO PAIN
PAINLEVEL_OUTOF10: 2
PAINLEVEL_OUTOF10: 7
PAINLEVEL_OUTOF10: 0 - NO PAIN
PAINLEVEL_OUTOF10: 5 - MODERATE PAIN
PAINLEVEL_OUTOF10: 0 - NO PAIN
PAINLEVEL_OUTOF10: 4
PAIN_LEVEL: 0

## 2025-08-07 ASSESSMENT — PATIENT HEALTH QUESTIONNAIRE - PHQ9
1. LITTLE INTEREST OR PLEASURE IN DOING THINGS: NOT AT ALL
SUM OF ALL RESPONSES TO PHQ9 QUESTIONS 1 & 2: 0
2. FEELING DOWN, DEPRESSED OR HOPELESS: NOT AT ALL

## 2025-08-07 ASSESSMENT — ACTIVITIES OF DAILY LIVING (ADL)
TOILETING: NEEDS ASSISTANCE
ASSISTIVE_DEVICE: WALKER
PATIENT'S MEMORY ADEQUATE TO SAFELY COMPLETE DAILY ACTIVITIES?: YES
LACK_OF_TRANSPORTATION: NO
FEEDING YOURSELF: INDEPENDENT
JUDGMENT_ADEQUATE_SAFELY_COMPLETE_DAILY_ACTIVITIES: YES
BATHING: NEEDS ASSISTANCE
ADEQUATE_TO_COMPLETE_ADL: YES
BATHING_ASSISTANCE: MINIMAL
HEARING - RIGHT EAR: FUNCTIONAL
WALKS IN HOME: NEEDS ASSISTANCE
LACK_OF_TRANSPORTATION: NO
DRESSING YOURSELF: NEEDS ASSISTANCE
GROOMING: INDEPENDENT
HEARING - LEFT EAR: FUNCTIONAL

## 2025-08-07 ASSESSMENT — PAIN DESCRIPTION - DESCRIPTORS
DESCRIPTORS: TINGLING;THROBBING
DESCRIPTORS: DISCOMFORT

## 2025-08-07 NOTE — ANESTHESIA PROCEDURE NOTES
Peripheral Block    Patient location during procedure: pre-op  Medication administered at: 8/7/2025 9:25 AM  End time: 8/7/2025 9:29 AM  Reason for block: at surgeon's request and post-op pain management  Staffing  Performed: attending   Authorized by: Polo Perez MD    Performed by: Polo Perez MD  Preanesthetic Checklist  Completed: patient identified, IV checked, site marked, risks and benefits discussed, surgical consent, monitors and equipment checked, pre-op evaluation and timeout performed   Timeout performed at: 8/7/2025 9:25 AM  Peripheral Block  Patient position: laying flat  Prep: ChloraPrep  Patient monitoring: heart rate, cardiac monitor and continuous pulse ox  Block type: PENG  Laterality: right  Injection technique: single-shot  Guidance: ultrasound guided  Local infiltration: ropivacaine  Infiltration strength: 0.5 %  Dose: 20 mL  Needle  Needle type: pencil-tip   Needle gauge: 21 G  Needle length: 5 cm  Needle localization: anatomical landmarks and ultrasound guidance  Assessment  Injection assessment: negative aspiration for heme, no paresthesia on injection, incremental injection and local visualized surrounding nerve on ultrasound  Paresthesia pain: none  Heart rate change: no  Slow fractionated injection: yes  Additional Notes  Patient given Versed 4mg and Fentanyl 100mcg IV.  Added to the block Clonidine 100mcg and Depomedrol 40mg.

## 2025-08-07 NOTE — ANESTHESIA PROCEDURE NOTES
Airway  Date/Time: 8/7/2025 11:00 AM  Reason: elective    Airway not difficult    Staffing  Performed: CRNA   Authorized by: Polo Perez MD    Performed by: YAEL Prescott-MARIAA  Patient location during procedure: OR    Patient Condition  Indications for airway management: anesthesia and airway protection  Patient position: sniffing  MILS maintained throughout  Planned trial extubation  Sedation level: deep     Final Airway Details   Preoxygenated: yes  Final airway type: endotracheal airway  Successful airway: ETT  Cuffed: yes   Successful intubation technique: direct laryngoscopy  Adjuncts used in placement: intubating stylet  Endotracheal tube insertion site: oral  Blade: Leopoldo  Blade size: #3  ETT size (mm): 7.0  Cormack-Lehane Classification: grade I - full view of glottis  Placement verified by: chest auscultation and capnometry   Cuff volume (mL): 10  Measured from: lips  ETT to lips (cm): 20  Ventilation between attempts: none  Number of other approaches attempted: 0    Additional Comments  Atraumatic intubation with dentition in preanesthetic state.

## 2025-08-07 NOTE — ANESTHESIA POSTPROCEDURE EVALUATION
Patient: Jennifer Ramirez    Procedure Summary       Date: 08/07/25 Room / Location: [facility] 08 / [facility]    Anesthesia Start: 1051 Anesthesia Stop: 1304    Procedure: RIGHT TOTAL HIP REPLACEMENT ANTERIOR APPROACH WITH C-ARM (Right: Hip) Diagnosis:       Unilateral primary osteoarthritis, right hip      (Unilateral primary osteoarthritis, right hip [M16.11])    Surgeons: Lit Patel MD Responsible Provider: Polo Perez MD    Anesthesia Type: regional, general ASA Status: 2            Anesthesia Type: regional, general    Vitals Value Taken Time   /59 08/07/25 13:03   Temp 36.3 08/07/25 13:05   Pulse 79 08/07/25 13:03   Resp 16 08/07/25 13:05   SpO2 98 % 08/07/25 13:03   Vitals shown include unfiled device data.    Anesthesia Post Evaluation    Patient location during evaluation: PACU  Patient participation: complete - patient participated  Level of consciousness: awake and alert  Pain score: 0  Pain management: satisfactory to patient  Airway patency: patent  Cardiovascular status: acceptable and hemodynamically stable  Respiratory status: acceptable, nonlabored ventilation, spontaneous ventilation and face mask  Hydration status: acceptable  Postoperative Nausea and Vomiting: none        There were no known notable events for this encounter.

## 2025-08-07 NOTE — ANESTHESIA PREPROCEDURE EVALUATION
Patient: Jennifer Ramirez    Procedure Information       Date/Time: 08/07/25 0945    Procedure: RIGHT TOTAL HIP REPLACEMENT ANTERIOR APPROACH WITH C-ARM (Right: Hip) - 23HR OBS/BEDDED OP / ARTHROPLASTY, HIP, TOTAL, ANTERIOR APPROACH    Location: [facility] 06 / [facility]    Surgeons: Lit Patel MD            Relevant Problems   Musculoskeletal   (+) Unilateral primary osteoarthritis, right hip       Clinical information reviewed:                   NPO Detail:  No data recorded     Physical Exam    Airway  Mallampati: II  TM distance: >3 FB  Neck ROM: full  Mouth opening: 3 or more finger widths     Cardiovascular - normal exam  Rhythm: regular  Rate: normal     Dental - normal exam     Pulmonary - normal exam   Abdominal            Anesthesia Plan    History of general anesthesia?: yes  History of complications of general anesthesia?: no    ASA 2     regional and general     The patient is a current smoker.  Patient was previously instructed to abstain from smoking on day of procedure.  Patient did not smoke on day of procedure.    intravenous induction   Postoperative pain plan includes opioids.  Trial extubation is planned.  Anesthetic plan and risks discussed with patient.  Use of blood products discussed with patient who consented to blood products.    Plan discussed with CRNA.

## 2025-08-07 NOTE — PROGRESS NOTES
Occupational Therapy  Evaluation    Patient Name: Jennifer Ramirez  MRN: [id number]  Today's Date: 8/7/2025  Time Calculation  Start Time: 1429  Stop Time: 1437  Time Calculation (min): 8 min    Current Problem:   1. Unilateral primary osteoarthritis, right hip        OT order: OT eval and treat   Referred by: Jorge  Reason for referral: ADL; s/p elective R ANTNOI, anterior apporach by Dr. Patel on 8/7/25  Past medical history related to rehab: HLD    Precautions:   LE Weight Bearing Status: Weight Bearing as Tolerated (RLE)  Medical Precautions: Fall precautions  Post-Surgical Precautions: Right hip precautions (anterior approach)    ASSESSMENT  OT Assessment: Pt is a 50 y.o. female s/p elective R ANTONI, anterior approach now WBAT. Prior to admit, pt  reports being independent with ADLs, and iADLs, did not use device for mobility. Upon eval pt required SBA for bed mobility, min A for functional mobility. Pt able to complete transfers with SBA  and FWW. Pt appears below functional baseline and would benefit from acute skilled OT services and low therapy when medically stable for d/c.. Pt with Decreased ADL status, Decreased safe judgment during ADL, Decreased endurance, Decreased functional mobility  Prognosis: Good  Barriers to discharge home: No anticipated barriers           Tolerance: Patient tolerated treatment well    PLAN  Frequency: 3 times per week  Treatment Interventions: ADL retraining, Functional transfer training, Endurance training, Patient/family training, Equipment evaluation/education  Discharge Recommendations: Low intensity level of continued care  OT OK to discharge: Yes (when medically cleared)    GENERAL VISIT INFORMATION   Start of session communication: Bedside nurse  End of session communication: Bedside nurse  Family/caregiver present: No  Co-Treatment: PT  Reason for co-treatment: To maximize pt safety while focusing on specifc discipline goals   Position Pt Received:  On cart, Bed, 2 rail up  (Seen in PACU)  End of session position: Bed, 2 rail up, Alarm off, not on at start of session (call light within reach)    SUBJECTIVE  Home Living:  Type of Home: House  Lives With: Parent(s) (will be staying with mom post surgery)  Home Adaptive Equipment: Walker rolling or standard  Home Layout: One level  Home Access: Stairs to enter without rails  Entrance Stairs-Number of Steps: 1  Bathroom Shower/Tub: Walk-in shower  Bathroom Toilet: Handicapped height (+ rails)  Bathroom Equipment: Grab bars in shower, Shower chair with back, Raised toilet seat with rails, Hand-held shower hose     Prior Level of Function:  Level of [place]: Independent with ADLs and functional transfers, Independent with homemaking with ambulation  Prior Function Comments: Pt reports being independent with ADL/IADLs. No device    IADL History:  Homemaking Responsibilities: Yes  Current License: Yes    Pain:  Assessment: 0-10  Score: 0 - No pain (did not numerically rate)  Type: Acute pain, Surgical pain  Location: Hip  Interventions: Cold applied  Response to pain interventions:      OBJECTIVE  Vital Signs:  Vitals Session: During OT  Vital Signs Comment: No concerns    Cognition:  Overall Cognitive Status: Within Functional Limits  Orientation Level: Oriented X4             Current ADL function:   EATING:  Independent     GROOMING: Stand by     BATHING: Minimal     UB DRESSING: Stand by     LB DRESSING: Minimal     TOILETING: Minimal    ADL comments:  anticapted    Activity Tolerance:  Endurance: Tolerates less than 10 min exercise, no significant change in vital signs, Decreased tolerance for upright activites  Activity Tolerance Comments: fair    Bed Mobility/Transfers:   Bed Mobility  Bed Mobility: Yes  Bed Mobility 1  Bed Mobility 1: Supine to sitting, Sitting to supine  Level of Assistance 1:  (SBA)  Bed Mobility Comments 1: increased time  Transfers  Transfer: Yes  Transfer 1  Transfer From 1: Bed to, Stand to  Transfer to 1:  Sit  Technique 1: Sit to stand, Stand to sit  Transfer Device 1:  (FWW)  Transfer Level of Assistance 1: Contact guard    Ambulation/Gait Training:  Functional Mobility  Functional Mobility Performed: Yes  Functional Mobility 1  Surface 1: Level tile  Device 1: Rolling walker  Assistance 1: Minimum assistance  Comments 1: cues for sequencing and FWW use for side stepping at EOB.    Sitting Balance:  Static Sitting Balance  Static Sitting-Balance Support: Feet supported  Static Sitting-Level of Assistance: Contact guard  Dynamic Sitting Balance  Dynamic Sitting-Balance Support: Feet supported  Dynamic Sitting-Level of Assistance: Contact guard    Standing Balance:  Static Standing Balance  Static Standing-Balance Support: Bilateral upper extremity supported  Static Standing-Level of Assistance: Contact guard  Dynamic Standing Balance  Dynamic Standing-Balance Support: Bilateral upper extremity supported  Dynamic Standing-Level of Assistance: Minimum assistance    Vision: Vision - Basic Assessment  Current Vision: No visual deficits   and      Sensation:  Light Touch: No apparent deficits    Strength:  Strength Comments: B UE grossly 3/5    Perception:  Inattention/Neglect: Appears intact    Coordination:  Movements are Fluid and Coordinated: Yes     Hand Function:  Hand Function  Gross Grasp: Functional  Coordination: Functional    Extremities: RUE   RUE : Within Functional Limits and LUE   LUE: Within Functional Limits    Outcome Measures: [facility] Daily Activity   Putting on and taking off regular lower body clothing: A little  Bathing (including washing, rinsing, drying): A little  Putting on and taking off regular upper body clothing: A little  Toileting, which includes using toilet, bedpan or urinal: A little  Taking care of personal grooming such as brushing teeth: A little   Eating Meals: None   Daily Activity - Total Score: 19    EDUCATION:     Education Documentation  Body Mechanics, taught by Angelic Nettles, OT  at 8/7/2025  2:49 PM.  Learner: Patient  Readiness: Acceptance  Method: Explanation  Response: Verbalizes Understanding, Demonstrated Understanding  Comment: ADL/functional mobility/precautions safety and training    Precautions, taught by Angelic Nettles OT at 8/7/2025  2:49 PM.  Learner: Patient  Readiness: Acceptance  Method: Explanation  Response: Verbalizes Understanding, Demonstrated Understanding  Comment: ADL/functional mobility/precautions safety and training    ADL Training, taught by Angelic Nettles OT at 8/7/2025  2:49 PM.  Learner: Patient  Readiness: Acceptance  Method: Explanation  Response: Verbalizes Understanding, Demonstrated Understanding  Comment: ADL/functional mobility/precautions safety and training    Education Comments  No comments found.        Goals:   Encounter Problems       Encounter Problems (Active)       ADLs       Patient with complete lower body dressing with modified independent level of assistance  with PRN adaptive equipment while supported sitting       Start:  08/07/25    Expected End:  08/21/25            Patient will complete toileting including hygiene clothing management/hygiene with modified independent level of assistance.       Start:  08/07/25    Expected End:  08/21/25               MOBILITY       Patient will perform Functional mobility max Household distances/Community Distances with modified independent level of assistance and front wheeled walker in order to improve safety and functional mobility.       Start:  08/07/25    Expected End:  08/21/25               TRANSFERS       Patient will perform bed mobility modified independent level of assistance in order to improve safety and independence with mobility       Start:  08/07/25    Expected End:  08/21/25

## 2025-08-07 NOTE — PROGRESS NOTES
Physical Therapy    Physical Therapy Evaluation    Patient Name: Jennifer Ramirez  MRN: [id number]  Today's Date: 8/7/2025   Time Calculation  Start Time: 1428  Stop Time: 1437  Time Calculation (min): 9 min  210/210-A    Assessment/Plan   PT Assessment  PT Assessment Results: Decreased strength, Impaired balance  Rehab Prognosis: Good  Barriers to Discharge Home: Physical needs  Physical Needs: Stair navigation into home limited by function/safety  Evaluation/Treatment Tolerance: Patient tolerated treatment well  Medical Staff Made Aware: Yes  Strengths: Ability to acquire knowledge, Attitude of self  Barriers to Participation: Comorbidities  End of Session Communication: Bedside nurse  Assessment Comment: Pt tolerated session well. pt preformed tasks with assist to maintain safety. pt would benefit from low int therapy to aid in fall prevention and improve LE strength in order to return to PLOF.  End of Session Patient Position: Bed, 2 rail up, Alarm off, not on at start of session (call light in reach, RN present)  IP OR SWING BED PT PLAN  Inpatient or Swing Bed: Inpatient  PT Plan  Treatment/Interventions: Bed mobility, Transfer training, Gait training, Balance training, Strengthening, Therapeutic exercise, Therapeutic activity, Stair training  PT Plan: Ongoing PT  PT Frequency: Daily  PT Discharge Recommendations: Low intensity level of continued care (24/7 supervision)  PT Recommended Transfer Status: Assist x1  PT - OK to Discharge: Yes (once medically cleared)    Subjective     Current Problem:  1. Unilateral primary osteoarthritis, right hip  Surgical Pathology Exam    Surgical Pathology Exam        Problem List[1]    General Visit Information:  General  Reason for Referral: Anterior R THR  Referred By: Jorge  Past Medical History Relevant to Rehab: HLD  Family/Caregiver Present: No  Prior to Session Communication: Bedside nurse  Patient Position Received: Bed, 2 rail up  General Comment: pt agreeable to  therapy    Home Living:  Home Living  Home Living Comments: pt will be staying with her mother. mothers house has 1 GER. First floor set up. pt has a WIS with GBs, chair, and HHS. pt has elevated toilet with GBs. IND in ADLs/iADLs. amb IND. owns w/w. drives    Prior Level of Function:  Prior Function Per Pt/Caregiver Report  Level of [place]: Independent with ADLs and functional transfers, Independent with homemaking with ambulation    Precautions:  Precautions  LE Weight Bearing Status: Weight Bearing as Tolerated  Medical Precautions: Fall precautions  Post-Surgical Precautions: Right hip precautions  Precautions Comment: Anterior hip precautions     Objective     Pain:  Pain Assessment  Pain Assessment: 0-10  0-10 (Numeric) Pain Score: 0 - No pain    Cognition:  Cognition  Overall Cognitive Status: Within Functional Limits    General Assessments:         Sensation  Light Touch: No apparent deficits  Strength  Strength Comments: LE strength on operational LE not assessed formally d/t hip precautions; however pt able to preform functional tasks. LE strength on nonoperational leg was 4/5. BLE ROM WFL.                   Functional Assessments:     Bed Mobility  Bed Mobility: Yes  Bed Mobility 1  Bed Mobility 1: Supine to sitting, Sitting to supine  Bed Mobility Comments 1: SBA  Transfers  Transfer: Yes  Transfer 1  Transfer to 1: Bed  Technique 1: Stand to sit, Sit to stand  Transfer Device 1: Walker  Trials/Comments 1: CGA  Ambulation/Gait Training  Ambulation/Gait Training Performed: Yes  Ambulation/Gait Training 1  Surface 1: Level tile  Device 1: Rolling walker  Comments/Distance (ft) 1: completed side steps with minAx1            Outcome Measures:     [facility] Basic Mobility  Turning from your back to your side while in a flat bed without using bedrails: A little  Moving from lying on your back to sitting on the side of a flat bed without using bedrails: A little  Moving to and from bed to chair (including a  wheelchair): A little  Standing up from a chair using your arms (e.g. wheelchair or bedside chair): A little  To walk in hospital room: A lot  Climbing 3-5 steps with railing: Total  Basic Mobility - Total Score: 15                                                             Goals:  Encounter Problems       Encounter Problems (Active)       PT Problem       STG - Pt will transfer STS IND (Progressing)       Start:  08/07/25    Expected End:  08/21/25            STG - Pt will perform a B LE ther ex program of 2-3 sets of 10  (Progressing)       Start:  08/07/25    Expected End:  08/21/25            STG - Pt will amb 100' using w/w with SBA (Progressing)       Start:  08/07/25    Expected End:  08/21/25            STG -  Pt will navigate 4 stairs using rail with SBA (Progressing)       Start:  08/07/25    Expected End:  08/21/25                 Education Documentation  Mobility Training, taught by Cyndi Reyes PT at 8/7/2025  3:12 PM.  Learner: Patient  Readiness: Acceptance  Method: Explanation  Response: Verbalizes Understanding, Needs Reinforcement  Comment: PT POC; precautions    Education Comments  No comments found.              [1]   Patient Active Problem List  Diagnosis    Unilateral primary osteoarthritis, right hip    S/P total right hip arthroplasty

## 2025-08-07 NOTE — OP NOTE
RIGHT TOTAL HIP REPLACEMENT ANTERIOR APPROACH WITH C-ARM (R) Operative Note     Date: 2025  OR Location: [facility] OR    Name: Jennifer Ramirez, : 1975, Age: 50 y.o., MRN: [id number], Sex: female    Diagnosis  Pre-op Diagnosis      * Unilateral primary osteoarthritis, right hip [M16.11] Post-op Diagnosis     * Unilateral primary osteoarthritis, right hip [M16.11]     Procedures  RIGHT TOTAL HIP REPLACEMENT ANTERIOR APPROACH WITH C-ARM  28228 - ME ARTHRP ACETBLR/PROX FEM PROSTC AGRFT/ALGRFT      Surgeons      * Lit Patel - Primary    Resident/Fellow/Other Assistant:  Surgeons and Role:  * No surgeons found with a matching role *    Staff:   Circulator: Anni Salazar Person: Colby  Surgical Assistant: Sedrick  Surgical Assistant: Thelma  Circulator: Татьяна    Anesthesia Staff: Anesthesiologist: Polo Perez MD  CRNA: YAEL Prescott-CRNA    Procedure Summary  Anesthesia: Regional, General  ASA: II  Estimated Blood Loss: 300 mL  Intra-op Medications:   Administrations occurring from 0945 to 1215 on 25:   Medication Name Total Dose   sodium chloride 0.9 % irrigation solution 1,000 mL   ropivacaine-epinephrine-clonidine-ketorolac 2.46-0.005- 0.0008-0.3mg/mL periarticular syringe 50 mL   dexAMETHasone (Decadron) 4 mg/mL IV Syringe 2 mL 4 mg   fentaNYL (Sublimaze) injection 50 mcg/mL 150 mcg   LR bolus Cannot be calculated   lidocaine (cardiac) injection 2% prefilled syringe 80 mg   midazolam (Versed) injection 1 mg/mL 2 mg   ondansetron (Zofran) 2 mg/mL injection 4 mg   propofol (Diprivan) injection 10 mg/mL 200 mg   rocuronium (ZeMuron) 50 mg/5 mL injection 50 mg   tranexamic acid IV 1,000 mg in 100 mL NaCl (iso) - premix 1,000 mg   ceFAZolin (Ancef) 2 g in dextrose (iso) IV 50 mL 2 g              Anesthesia Record               Intraprocedure I/O Totals          Intake    Tranexamic Acid 0.00 mL    The total shown is the total volume documented since Anesthesia Start was filed.    Total Intake 0  mL          Specimen:   ID Type Source Tests Collected by Time   1 : BONE& TISSUE RIGHT HIP Tissue FEMORAL HEAD RIGHT SURGICAL PATHOLOGY EXAM Lit Patel MD 8/7/2025 1127                 Drains and/or Catheters: * None in log *    Tourniquet Times:         Implants:  Implants       Type Name Action Serial No.      Joint SHELL, TRIDENT II, CLUSTERHOLE, 56F - SNA - [id number] Implanted NA     Screw SCREW, LOW PROFILE HEX, 6.5 X 20 MM - SNA - [id number] Implanted NA     Joint INSERT, TRIDENT X3 POLYETHYLENE, 0 DEG, 36MM F - SNA - [id number] Implanted NA     Screw SCREW, LOW PROFILE HEX, 6.5 X 20 MM - SNA - [id number] Implanted NA     Joint Hip STEM, HIP, MARLENA, INSIGNIA, 3 STANDARD - SN/A - [id number] Implanted N/A     Joint HEAD, FEMUR V40 36MM -5MM BIOLOX DELTA - SNA - [id number] Implanted NA              Findings: End-stage arthritic change of the right hip    Indications: Jennifer Ramirez is an 50 y.o. female who is having surgery for end-stage arthritic change to the right hip.  Patient has failed multiple conservative measures which have failed to provide consistent relief.  Patient's function has progressively declined and pain is increased to the point where surgery was discussed.  Risk benefits and alternatives were discussed in regards to right total hip arthroplasty.  With knowledge of risk benefits and alternatives patient did elect to proceed with operative intervention.    The patient was seen in the preoperative area. The risks, benefits, complications, treatment options, non-operative alternatives, expected recovery and outcomes were discussed with the patient. The possibilities of reaction to medication, pulmonary aspiration, injury to surrounding structures, bleeding, recurrent infection, the need for additional procedures, failure to diagnose a condition, and creating a complication requiring transfusion or operation were discussed with the patient. The patient concurred with the proposed plan,  giving informed consent.  The site of surgery was properly noted/marked if necessary per policy. The patient has been actively warmed in preoperative area. Preoperative antibiotics have been ordered and given within 1 hours of incision. Venous thrombosis prophylaxis have been ordered including bilateral sequential compression devices and chemical prophylaxis    Procedure Details: Patient was taken the operating room where he was positioned appropriately on the [place] table with both feet in boots and all bony prominences padded appropriately.  Preoperative x-rays were taken including AP hip and AP pelvis to use as a guide for leg lengths and ultimately for implant placement.  The right hip was prepped and draped as typical for extremity procedure.  A timeout was performed, all parties were identified, and the correct surgical site was noted.  At the conclusion of the timeout began by making a standard anterior approach to the hip.  Incision was made through skin exposing subcutaneous tissue.  Dissection was carried down to the tensor fascia jean marie.  Tensor fascia jean marie was then incised longitudinally in line with the incision.  The lateral femoral cutaneous nerve was protected in the surrounding tissues.  After incising the fascia longitudinally the muscle belly was then swept laterally.  The deep interval was subsequently identified between the rectus and abductors.  In the deep interval we did identify the ascending branch of the lateral femoral circumflex artery this was cauterized and dissection was then carried down to the capsule.  Soft tissue and reflected head of rectus was swept off the capsule.  Retractors were placed on either side of the capsule about the femoral neck.  A capsulectomy was subsequently performed and the femoral head and neck were visualized.  Retractors were subsequently repositioned inside the capsule and a standard neck cut was made.  The femoral head was subsequently removed and the  acetabulum was visualized.  Labrum and surrounding soft tissue remnants about an advanced arthritic acetabulum was subsequently excised.  Retractors were placed about the acetabulum to provide insertion of the reamers.  Subsequently reaming under fluoroscopic guidance was performed up to a size 56 [place] Trident II Acetabular liner .  This was subsequently inserted without difficulty with good press-fit obtained.  2 screws were placed for additional compression and rotational control.  A standard polyethylene liner was placed.      I then turned my attention to the femur.  Soft tissue releases about the femur down to the level of the lesser trochanter from a capsular standpoint as well as releasing off of the majority of the capsular attachments to the greater trochanter were performed.  Short external rotators were kept intact on the posterior aspect of the greater trochanter.  I placed a femoral hook elevator into the space between the gluteus chelsi and the greater trochanter to allow for better elevation of the femur.  Subsequently proceeded to drop the leg using the [place] table with external rotation to allow for full femoral exposure.  Exposure was adequate and subsequently broached up to a size 3 [place] Insignia femoral stem.  This was subsequently trialed with a standard offset femoral neck and a size 36 mm diameter -5 mm ceramic head.  Fluoroscopic imaging confirmed appropriate offset and leg lengths.  Trials were subsequently removed and final implants were subsequently seated with excellent rotational control.  Final fluoroscopic imaging was obtained demonstrating appropriately seated implants and adequate leg length.      Wound was copiously irrigated with 3 L of normal saline the deep space was infiltrated with 50 CC of HUBERT.  Fascia was closed with #1 Stratafix suture and skin was closed with 2-0 Vicryl and 3-0 stratafix.      Patient tolerated the procedure well no unexpected complications occurred.       Given the nature of the disease process and the procedure, a skilled surgical first assistant was necessary during the entire case. The assistant was necessary to retract soft tissue throughout the procedure, providing optimal exposure to the surgical site. They aided with manipulating and stabilizing the patient’s extremity or extremities throughout the case. They also assisted in maintaining hemostasis during the procedure. Towards the end of the procedure, I broke scrub for clinical documentation and dictation while the physician assistant completed layered closure of the operative wound with dressing application and patient transportation. A certified scrub tech was also present at the back table managing instruments and supplies for the surgical case. A qualified resident was not available for this procedure.    Complications:  None; patient tolerated the procedure well.    Disposition: PACU - hemodynamically stable.  Condition: stable         Postoperative plan: Patient will be admitted for postoperative pain control and work with physical therapy.  Patient will be weightbearing as tolerated on the operative extremity.  Will initiate aspirin 81 mg 2 times daily for DVT prophylaxis beginning on postoperative day #1.  Plan for discharge home on postop day #0 postoperative day #1 pending progression of improvement with pain and function.    Lit Patel  Phone Number: [phone]

## 2025-08-08 ENCOUNTER — HOME HEALTH ADMISSION (OUTPATIENT)
Dept: HOME HEALTH SERVICES | Facility: HOME HEALTH | Age: 50
End: 2025-08-08
Payer: MEDICAID

## 2025-08-08 ENCOUNTER — DOCUMENTATION (OUTPATIENT)
Dept: HOME HEALTH SERVICES | Facility: HOME HEALTH | Age: 50
End: 2025-08-08
Payer: MEDICAID

## 2025-08-08 ENCOUNTER — PHARMACY VISIT (OUTPATIENT)
Dept: PHARMACY | Facility: CLINIC | Age: 50
End: 2025-08-08
Payer: MEDICAID

## 2025-08-08 VITALS
RESPIRATION RATE: 18 BRPM | TEMPERATURE: 97.5 F | SYSTOLIC BLOOD PRESSURE: 101 MMHG | HEART RATE: 71 BPM | WEIGHT: 174.16 LBS | HEIGHT: 64 IN | OXYGEN SATURATION: 93 % | BODY MASS INDEX: 29.73 KG/M2 | DIASTOLIC BLOOD PRESSURE: 59 MMHG

## 2025-08-08 LAB
ANION GAP SERPL CALC-SCNC: 11 MMOL/L (ref 10–20)
BUN SERPL-MCNC: 14 MG/DL (ref 6–23)
CALCIUM SERPL-MCNC: 8.7 MG/DL (ref 8.6–10.3)
CHLORIDE SERPL-SCNC: 105 MMOL/L (ref 98–107)
CO2 SERPL-SCNC: 25 MMOL/L (ref 21–32)
CREAT SERPL-MCNC: 1.02 MG/DL (ref 0.5–1.05)
EGFRCR SERPLBLD CKD-EPI 2021: 67 ML/MIN/1.73M*2
ERYTHROCYTE [DISTWIDTH] IN BLOOD BY AUTOMATED COUNT: 12.1 % (ref 11.5–14.5)
GLUCOSE SERPL-MCNC: 107 MG/DL (ref 74–99)
HCT VFR BLD AUTO: 33.9 % (ref 36–46)
HGB BLD-MCNC: 10.8 G/DL (ref 12–16)
MCH RBC QN AUTO: 31.4 PG (ref 26–34)
MCHC RBC AUTO-ENTMCNC: 31.9 G/DL (ref 32–36)
MCV RBC AUTO: 99 FL (ref 80–100)
NRBC BLD-RTO: 0 /100 WBCS (ref 0–0)
PLATELET # BLD AUTO: 223 X10*3/UL (ref 150–450)
POTASSIUM SERPL-SCNC: 4.2 MMOL/L (ref 3.5–5.3)
RBC # BLD AUTO: 3.44 X10*6/UL (ref 4–5.2)
SODIUM SERPL-SCNC: 137 MMOL/L (ref 136–145)
WBC # BLD AUTO: 11.4 X10*3/UL (ref 4.4–11.3)

## 2025-08-08 PROCEDURE — 36415 COLL VENOUS BLD VENIPUNCTURE: CPT | Performed by: ORTHOPAEDIC SURGERY

## 2025-08-08 PROCEDURE — 97116 GAIT TRAINING THERAPY: CPT | Mod: GP,CQ

## 2025-08-08 PROCEDURE — 97110 THERAPEUTIC EXERCISES: CPT | Mod: GP,CQ

## 2025-08-08 PROCEDURE — 97535 SELF CARE MNGMENT TRAINING: CPT | Mod: GO,CO

## 2025-08-08 PROCEDURE — 2500000004 HC RX 250 GENERAL PHARMACY W/ HCPCS (ALT 636 FOR OP/ED): Mod: JZ | Performed by: ORTHOPAEDIC SURGERY

## 2025-08-08 PROCEDURE — 82565 ASSAY OF CREATININE: CPT | Performed by: ORTHOPAEDIC SURGERY

## 2025-08-08 PROCEDURE — RXMED WILLOW AMBULATORY MEDICATION CHARGE

## 2025-08-08 PROCEDURE — 84132 ASSAY OF SERUM POTASSIUM: CPT | Performed by: ORTHOPAEDIC SURGERY

## 2025-08-08 PROCEDURE — 2500000001 HC RX 250 WO HCPCS SELF ADMINISTERED DRUGS (ALT 637 FOR MEDICARE OP): Performed by: ORTHOPAEDIC SURGERY

## 2025-08-08 PROCEDURE — 99239 HOSP IP/OBS DSCHRG MGMT >30: CPT | Performed by: CLINICAL NURSE SPECIALIST

## 2025-08-08 PROCEDURE — 85027 COMPLETE CBC AUTOMATED: CPT | Performed by: ORTHOPAEDIC SURGERY

## 2025-08-08 PROCEDURE — 7100000011 HC EXTENDED STAY RECOVERY HOURLY - NURSING UNIT

## 2025-08-08 RX ORDER — ASPIRIN 81 MG/1
81 TABLET ORAL 2 TIMES DAILY
Qty: 60 TABLET | Refills: 0 | Status: SHIPPED | OUTPATIENT
Start: 2025-08-08 | End: 2025-09-07

## 2025-08-08 RX ORDER — POLYETHYLENE GLYCOL 3350 17 G/17G
17 POWDER, FOR SOLUTION ORAL DAILY PRN
Qty: 119 G | Refills: 0 | Status: SHIPPED | OUTPATIENT
Start: 2025-08-08

## 2025-08-08 RX ORDER — ACETAMINOPHEN 325 MG/1
1000 TABLET ORAL 3 TIMES DAILY
Qty: 126 TABLET | Refills: 0 | Status: SHIPPED | OUTPATIENT
Start: 2025-08-08 | End: 2025-08-22

## 2025-08-08 RX ORDER — CYCLOBENZAPRINE HCL 10 MG
10 TABLET ORAL 3 TIMES DAILY PRN
Qty: 15 TABLET | Refills: 0 | Status: SHIPPED | OUTPATIENT
Start: 2025-08-08 | End: 2025-08-13

## 2025-08-08 RX ORDER — KETOROLAC TROMETHAMINE 10 MG/1
10 TABLET, FILM COATED ORAL EVERY 8 HOURS PRN
Qty: 9 TABLET | Refills: 0 | Status: SHIPPED | OUTPATIENT
Start: 2025-08-08

## 2025-08-08 RX ADMIN — KETOROLAC TROMETHAMINE 15 MG: 30 INJECTION, SOLUTION INTRAMUSCULAR at 03:59

## 2025-08-08 RX ADMIN — CEFAZOLIN SODIUM 2 G: 2 SOLUTION INTRAVENOUS at 03:59

## 2025-08-08 RX ADMIN — ACETAMINOPHEN 650 MG: 325 TABLET ORAL at 00:41

## 2025-08-08 RX ADMIN — KETOROLAC TROMETHAMINE 15 MG: 30 INJECTION, SOLUTION INTRAMUSCULAR at 08:37

## 2025-08-08 RX ADMIN — ASPIRIN 81 MG: 81 TABLET, COATED ORAL at 08:37

## 2025-08-08 RX ADMIN — SODIUM CHLORIDE, SODIUM LACTATE, POTASSIUM CHLORIDE, AND CALCIUM CHLORIDE 50 ML/HR: .6; .31; .03; .02 INJECTION, SOLUTION INTRAVENOUS at 00:41

## 2025-08-08 RX ADMIN — ACETAMINOPHEN 650 MG: 325 TABLET ORAL at 06:41

## 2025-08-08 ASSESSMENT — COGNITIVE AND FUNCTIONAL STATUS - GENERAL
CLIMB 3 TO 5 STEPS WITH RAILING: A LITTLE
DRESSING REGULAR LOWER BODY CLOTHING: A LITTLE
WALKING IN HOSPITAL ROOM: A LITTLE
PERSONAL GROOMING: A LITTLE
HELP NEEDED FOR BATHING: A LITTLE
MOVING TO AND FROM BED TO CHAIR: A LITTLE
STANDING UP FROM CHAIR USING ARMS: A LITTLE
TURNING FROM BACK TO SIDE WHILE IN FLAT BAD: A LITTLE
MOVING FROM LYING ON BACK TO SITTING ON SIDE OF FLAT BED WITH BEDRAILS: A LITTLE
DAILY ACTIVITIY SCORE: 20
TOILETING: A LITTLE
MOBILITY SCORE: 18

## 2025-08-08 ASSESSMENT — PAIN SCALES - GENERAL: PAINLEVEL_OUTOF10: 5 - MODERATE PAIN

## 2025-08-08 ASSESSMENT — PAIN - FUNCTIONAL ASSESSMENT
PAIN_FUNCTIONAL_ASSESSMENT: 0-10
PAIN_FUNCTIONAL_ASSESSMENT: 0-10

## 2025-08-08 ASSESSMENT — ACTIVITIES OF DAILY LIVING (ADL): HOME_MANAGEMENT_TIME_ENTRY: 35

## 2025-08-08 NOTE — PROGRESS NOTES
Physical Therapy    Physical Therapy    Physical Therapy Treatment    Patient Name: Jennifer Ramirez  MRN: [id number]  Today's Date: 8/8/2025  Time Calculation  Start Time: 0918  Stop Time: 0947  Time Calculation (min): 29 min     210/210-A    Assessment/Plan   PT Assessment  PT Assessment Results: Decreased strength, Impaired balance  Rehab Prognosis: Good  Barriers to Discharge Home: Physical needs  Physical Needs: Stair navigation into home limited by function/safety  Evaluation/Treatment Tolerance: Patient tolerated treatment well  Medical Staff Made Aware: Yes  Strengths: Ability to acquire knowledge, Attitude of self  Barriers to Participation: Comorbidities  End of Session Communication: Bedside nurse  Assessment Comment: Pt tolerated session well. pt preformed tasks with assist to maintain safety. pt would benefit from low int therapy to aid in fall prevention and improve LE strength in order to return to PLOF.  End of Session Patient Position: Up in chair, Alarm on     PT Plan  Treatment/Interventions: Bed mobility, Transfer training, Gait training, Balance training, Strengthening, Therapeutic exercise, Therapeutic activity, Stair training  PT Plan: Ongoing PT  PT Frequency: Daily  PT Discharge Recommendations: Low intensity level of continued care (24/7 supervision)  PT Recommended Transfer Status: Assist x1  PT - OK to Discharge: Yes (once medically cleared)    General Visit Information:   PT  Visit  PT Received On: 08/08/25  General  Family/Caregiver Present:  (pt's sister present at start of therapy tx)  Prior to Session Communication: Bedside nurse  Patient Position Received: Up in chair, Alarm on  General Comment: pt pleasant and agreeable to participate in therapy  Subjective     Precautions:  Precautions  LE Weight Bearing Status: Weight Bearing as Tolerated  Medical Precautions: Fall precautions  Post-Surgical Precautions: Right hip precautions (anterior approach)       Objective     Pain:  Pain  Assessment  Pain Assessment: 0-10  0-10 (Numeric) Pain Score: 5 - Moderate pain  Pain Type: Surgical pain  Pain Location: Hip  Pain Orientation: Right  Pain Interventions: Cold applied (both pre and post tx session)    Cognition:  Cognition  Overall Cognitive Status: Within Functional Limits  Orientation Level: Oriented X4    Treatments:  Therapeutic Exercise  Therapeutic Exercise Performed:  (supine BLE AP, GS, QS & RLE HS, SAQ, and hip AB x20 ea.  seated RLE LAQ x20 ea.  exercise handout sheet provided.)     Bed Mobility  Bed Mobility:  (sup <> sit with supervision)    Transfers  Transfer:  (sit <> stand with FWW and SBA)    Ambulation/Gait Training  Ambulation/Gait Training Performed:  (pt ambulates >/=75 ft x 2 with FWW and SBA.  safe yasmany; partial to full step through; pt demos safe walker management/approximation.)    Stairs  Stairs:  (pt navigates up/down 4 steps with BHR and SBA/CGA.  step-to-step pattern.  cuing for sequencing.)       Outcome Measures:  [facility] Basic Mobility  Turning from your back to your side while in a flat bed without using bedrails: A little  Moving from lying on your back to sitting on the side of a flat bed without using bedrails: A little  Moving to and from bed to chair (including a wheelchair): A little  Standing up from a chair using your arms (e.g. wheelchair or bedside chair): A little  To walk in hospital room: A little  Climbing 3-5 steps with railing: A little  Basic Mobility - Total Score: 18    Education Documentation  Handouts, taught by Aida Hermosillo PTA at 8/8/2025  9:58 AM.  Learner: Patient  Readiness: Acceptance  Method: Explanation  Response: Verbalizes Understanding, Demonstrated Understanding    Precautions, taught by Aida Hermosillo PTA at 8/8/2025  9:58 AM.  Learner: Patient  Readiness: Acceptance  Method: Explanation  Response: Verbalizes Understanding, Demonstrated Understanding    Home Exercise Program, taught by Aida Hermosillo PTA at 8/8/2025  9:58  AM.  Learner: Patient  Readiness: Acceptance  Method: Explanation  Response: Verbalizes Understanding, Demonstrated Understanding    Mobility Training, taught by Aida Hermosillo PTA at 8/8/2025  9:58 AM.  Learner: Patient  Readiness: Acceptance  Method: Explanation  Response: Verbalizes Understanding, Demonstrated Understanding      EDUCATION:     Encounter Problems       Encounter Problems (Active)       PT Problem       STG - Pt will transfer STS IND (Progressing)       Start:  08/07/25    Expected End:  08/21/25            STG - Pt will perform a B LE ther ex program of 2-3 sets of 10  (Progressing)       Start:  08/07/25    Expected End:  08/21/25            STG - Pt will amb 100' using w/w with SBA (Progressing)       Start:  08/07/25    Expected End:  08/21/25            STG -  Pt will navigate 4 stairs using rail with SBA (Progressing)       Start:  08/07/25    Expected End:  08/21/25

## 2025-08-08 NOTE — PROGRESS NOTES
Occupational Therapy    OT Treatment    Patient Name: Jennifer Ramirez  MRN: [id number]  Department:   Room: [address] [place] [address]  Today's Date: 8/8/2025  Time Calculation  Start Time: 0845  Stop Time: 0920  Time Calculation (min): 35 min        Assessment:  End of Session Communication: Bedside nurse  End of Session Patient Position: Up in chair, Alarm on     Plan:  Treatment Interventions: ADL retraining, Functional transfer training, Endurance training, Patient/family training, Equipment evaluation/education  OT Frequency: 3 times per week  OT Discharge Recommendations: Low intensity level of continued care  OT Recommended Transfer Status: Assist of 1, Stand by assist, Minimal assist  OT - OK to Discharge: Yes (when medically cleared)  Treatment Interventions: ADL retraining, Functional transfer training, Endurance training, Patient/family training, Equipment evaluation/education    Subjective   OT Visit Info:     General Visit Info:  General  Family/Caregiver Present: Yes (Sister)  Prior to Session Communication: Bedside nurse  Patient Position Received: Up in chair, Alarm on  General Comment: Pt agreeable to therapy  Precautions:  Medical Precautions: Fall precautions  Post-Surgical Precautions: Right hip precautions      Vital Signs Comment: No concerns     Pain:  Pain Assessment  Pain Assessment: 0-10 (Pt states that pain level is at a 5/10 in R hip.)  Pain Interventions: Cold applied    Objective    Cognition:  Cognition  Overall Cognitive Status: Within Functional Limits  Orientation Level: Oriented X4     Activities of Daily Living: LE Dressing  LE Dressing: Yes  LE Dressing Adaptive Equipment: Reacher, Sock aide  Pants Level of Assistance: Close supervision  Sock Level of Assistance: Close supervision  Compression Hose Level of Assistance: Maximum assistance (Pt will have assist at home for donning and doffing)  LE Dressing Where Assessed: Chair     Bed Mobility/Transfers: Transfers  Transfer: Yes (Pt completed sit  <>stand at SBA)      Functional Mobility:  Functional Mobility  Functional Mobility Performed: Yes  Functional Mobility 1  Surface 1: Level tile  Device 1: Rolling walker  Assistance 1: Close supervision  Comments 1: Pt able to ambulate household distances to complete grooming task at sinkside    Outcome Measures:[facility] Daily Activity  Putting on and taking off regular lower body clothing: A little  Bathing (including washing, rinsing, drying): A little  Putting on and taking off regular upper body clothing: None  Toileting, which includes using toilet, bedpan or urinal: A little  Taking care of personal grooming such as brushing teeth: A little  Eating Meals: None  Daily Activity - Total Score: 20        Education Documentation  Body Mechanics, taught by ARCELIA Norman at 8/8/2025  1:24 PM.  Learner: Patient  Readiness: Acceptance  Method: Explanation  Response: Verbalizes Understanding    Precautions, taught by ARCELIA Norman at 8/8/2025  1:24 PM.  Learner: Patient  Readiness: Acceptance  Method: Explanation  Response: Verbalizes Understanding    ADL Training, taught by ARCELIA Norman at 8/8/2025  1:24 PM.  Learner: Patient  Readiness: Acceptance  Method: Explanation  Response: Verbalizes Understanding    Body Mechanics, taught by ARCELIA Norman at 8/8/2025  9:34 AM.  Learner: Patient  Readiness: Acceptance  Method: Explanation  Response: Verbalizes Understanding    Precautions, taught by ARCELIA Norman at 8/8/2025  9:34 AM.  Learner: Patient  Readiness: Acceptance  Method: Explanation  Response: Verbalizes Understanding    ADL Training, taught by ARCELIA Norman at 8/8/2025  9:34 AM.  Learner: Patient  Readiness: Acceptance  Method: Explanation  Response: Verbalizes Understanding    Education Comments  Pt educated on use of compression stockings, importance of compression stockings, donning/doffing, and wear schedule.Pt educated on transferring in/out of car adhering to precautions.  Following demonstration from this therapist pt verbalized understanding. Pt educated on transferring in/out of tub adhering to precautions. This therapist recommending completing with home health therapist to complete in home set up for further recommendations and increased safety. Pt educated on THR precautions as applied to self care tasks and transfers throughout tx session. Pt verbalized and demonstrated understanding.         OP EDUCATION:       Goals:  Encounter Problems       Encounter Problems (Active)       ADLs       Patient with complete lower body dressing with modified independent level of assistance  with PRN adaptive equipment while supported sitting (Progressing)       Start:  08/07/25    Expected End:  08/21/25            Patient will complete toileting including hygiene clothing management/hygiene with modified independent level of assistance. (Progressing)       Start:  08/07/25    Expected End:  08/21/25               MOBILITY       Patient will perform Functional mobility max Household distances/Community Distances with modified independent level of assistance and front wheeled walker in order to improve safety and functional mobility. (Progressing)       Start:  08/07/25    Expected End:  08/21/25               TRANSFERS       Patient will perform bed mobility modified independent level of assistance in order to improve safety and independence with mobility (Progressing)       Start:  08/07/25    Expected End:  08/21/25

## 2025-08-08 NOTE — DISCHARGE INSTRUCTIONS
PAIN MANAGEMENT AT HOME:  To provide the best pain control over the next few days when pain will be at it's worst, we suggest you continue to take the following medications routinely and separately to provide the best pain management.  In addition, apply ice VERY FREQUENTLY to incision.   Also use some type of alternative intervention such as music therapy, relaxation techniques, or an type of diversion (such as watching television or talking to a friend) to help control pain.  Do NOT take more than 4000mg of acetaminophen (tylenol) in 24 hours.        CONSTIPATION MANAGEMENT AT HOME:  Constipation is common after Joint Replacement surgery for several reasons.  A common side effect of all pain medication is constipation.  A decrease in your activity as well as change in your normal diet & appetite all contribute to the constipation.  At home, it is important to take a daily stool softener such as Colace, Milk of magnesium or senokot while you are taking pain medications to help manage the constipation.  In addition, if you do NOT have a bowel movement within 72 hours of discharge, add a laxative such as miralax.  Miralax normally takes 2 to 3 days to work so you will not receive immediate results.  It is also important to drink plenty of fluids, especially water.  Stool softeners & laxatives need water to work properly.  We also suggest you increase your fiber intake either with foods high in fiber or with some type of supplement such as benefiber or metamucil.    Foods that are high in fiber include:     Fruits such as pears, strawberries, avocado, apples, raspberries, bananas, kiwis   Vegetables such as carrots, beets, broccoli, brussel sprouts, spinach   Lentils and kidney beans   Split peas and chickpeas   Oats, almonds, carlos seeds, and sweet potatoes      ACTIVITY AT HOME:  You will need to continue with your therapy after discharge either with in home therapy or at an outpatient facility.  Most patients  initially do in home therapy for about two weeks then transition to outpatient therapy.  You have freedom of choice in which in home therapy and outpatient facility you plan to use.  Most in home therapy sessions will begin within 24 to 48 hours after discharge.  After about two weeks of in home therapy, you will most likely to ready for outpatient therapy sessions.  Outpatient therapy is normally twice a week for weeks.  While you are at home it is important that you perform the exercises the therapist went over with you in the hospital 2 to 3 times a day.  After you complete your exercises, apply ice to your incision to help with swelling and pain.  You should also be getting up and walking around your house every hour with the use of your walker.  While at rest, perform, ankle pumps on each foot at least ten times.  This will help with the swelling as well as decrease your risk for blood clots.  ALWAYS USE YOUR WALKER WITH ANY TYPE OF ACTIVITY!!!!    WOUND CARE:  The bandage on your incision will stay in place for 7 days.  The bandage is waterproof so you may shower with the bandage in place.  No need to wrap or cover it.  Some drainage on your bandage is perfectly normal.  Home health care will assist you with bandage removal.  Once the bandage is removed, your incision can be left open to air if there is no drainage present.  If your incision is draining at the time of bandage removal, home health care will assist you with applying a new gauze bandage.  Gauze bandages are NOT waterproof.  After the removal of the waterproof bandage, do NOT get your incision wet.  Swelling in your operative extremity will peak about 72 hours after surgery and can last for weeks.  To help with the swelling make sure you are elevating your leg and apply ice frequently.  In addition, you will receive compression stockings upon discharge from the hospital.  Make sure you are wearing these stockings on both your legs at home.   Generally we instruct you to wear during the day and remove at bedtime for the next 4 weeks.      *Apply ice to incision at least 5 times daily    *Wear bilateral naomi hose stockings to lower extremities for next 4 weeks    *Do NOT take any non steroidal anti-inflammatory medications such as motrin, aleve, ibuprofen, celebrex or meloxicam    *Take daily stool softener.  If you experience any diarrhea, stop medication    *If you need a refill on your pain medication, contact your surgeon's office Monday through Thursday with as least 24 hours notice    Approximately one year after your surgery, you will be asked to complete a patient reported outcome measure survey.  This is one of the surveys you completed prior to surgery.  In order to measure our program outcomes, it is important that we get feedback from our patients on their progress.  In addition, having patients complete these forms both prior as well as after surgery is a government regulation.  This survey will be sent to you on your [employer] account.  Please take a few minutes to complete.  If you have any questions about the survey and/or need assistance, please contact Johanna Sanchez.    If you have any questions or concerns please contact the Orthopedic  Johanna Sanchez at:  [phone]  [email or web address]  Message on  [place] via secure chat    Shruthi Hubbard RN, Unit Clinical Coordinator  [phone]    Thank you for choosing [facility] to have your total joint replacement surgery.  It was our pleasure assisting you in your recovery.

## 2025-08-08 NOTE — PROGRESS NOTES
Patient attended pre op educational tjr class.  RAPT score 10.  All preoperative PROMs surveys completed.    Discussed discharge plan with patient.  For discharge home today with [facility] follow up.  Patient has no preference in which [facility] agency she utilizes and is aware referral will be placed to [facility] for SOC tomorrow.  Patient agreeable.    Reviewed discharge instructions with patient and family member at bedside.  Patient verbalizes understanding of activity, wound care, pain management, home going medications and follow up care.  TJR zone form and new medication administration form provided to patient.

## 2025-08-08 NOTE — CARE PLAN
Problem: Pain - Adult  Goal: Verbalizes/displays adequate comfort level or baseline comfort level  8/7/2025 2203 by Mala Russo RN  Outcome: Progressing  8/7/2025 2202 by Mala Russo RN  Outcome: Progressing     Problem: Safety - Adult  Goal: Free from fall injury  8/7/2025 2203 by Mala Russo RN  Outcome: Progressing  8/7/2025 2202 by Mala Russo RN  Outcome: Progressing     Problem: Discharge Planning  Goal: Discharge to home or other facility with appropriate resources  8/7/2025 2203 by Mala Russo RN  Outcome: Progressing  8/7/2025 2202 by Mala Russo RN  Outcome: Progressing     Problem: Chronic Conditions and Co-morbidities  Goal: Patient's chronic conditions and co-morbidity symptoms are monitored and maintained or improved  8/7/2025 2203 by Mala Russo RN  Outcome: Progressing  8/7/2025 2202 by Mala Russo RN  Outcome: Progressing     Problem: Nutrition  Goal: Nutrient intake appropriate for maintaining nutritional needs  8/7/2025 2203 by Mala Russo RN  Outcome: Progressing  8/7/2025 2202 by Mala Russo RN  Outcome: Progressing     Problem: Fall/Injury  Goal: Not fall by end of shift  8/7/2025 2203 by Mala Russo RN  Outcome: Progressing  8/7/2025 2202 by Mala Russo RN  Outcome: Progressing  Goal: Be free from injury by end of the shift  8/7/2025 2203 by Mala Russo RN  Outcome: Progressing  8/7/2025 2202 by Mala Russo RN  Outcome: Progressing  Goal: Verbalize understanding of personal risk factors for fall in the hospital  8/7/2025 2203 by Mala Russo RN  Outcome: Progressing  8/7/2025 2202 by Mala Russo RN  Outcome: Progressing  Goal: Verbalize understanding of risk factor reduction measures to prevent injury from fall in the home  8/7/2025 2203 by Mala Russo RN  Outcome: Progressing  8/7/2025 2202 by Mala Russo RN  Outcome: Progressing  Goal: Use assistive devices by end of the shift  8/7/2025 2203 by Mala Russo RN  Outcome:  Progressing  8/7/2025 2202 by Mala Russo RN  Outcome: Progressing  Goal: Pace activities to prevent fatigue by end of the shift  8/7/2025 2203 by Mala Russo RN  Outcome: Progressing  8/7/2025 2202 by Mala Russo RN  Outcome: Progressing     Problem: Pain  Goal: Takes deep breaths with improved pain control throughout the shift  8/7/2025 2203 by Mala Russo RN  Outcome: Progressing  8/7/2025 2202 by Mala Russo RN  Outcome: Progressing  Goal: Turns in bed with improved pain control throughout the shift  8/7/2025 2203 by Mala Russo RN  Outcome: Progressing  8/7/2025 2202 by Mala Russo RN  Outcome: Progressing  Goal: Walks with improved pain control throughout the shift  8/7/2025 2203 by Mala Russo RN  Outcome: Progressing  8/7/2025 2202 by Mala Russo RN  Outcome: Progressing  Goal: Performs ADL's with improved pain control throughout shift  8/7/2025 2203 by Mala Russo RN  Outcome: Progressing  8/7/2025 2202 by Mala Russo RN  Outcome: Progressing  Goal: Participates in PT with improved pain control throughout the shift  8/7/2025 2203 by Mala Russo RN  Outcome: Progressing  8/7/2025 2202 by Mala Russo RN  Outcome: Progressing  Goal: Free from opioid side effects throughout the shift  8/7/2025 2203 by Mala Russo RN  Outcome: Progressing  8/7/2025 2202 by Mala Russo RN  Outcome: Progressing  Goal: Free from acute confusion related to pain meds throughout the shift  8/7/2025 2203 by Mala Russo RN  Outcome: Progressing  8/7/2025 2202 by Mala Russo RN  Outcome: Progressing     Problem: Skin  Goal: Decreased wound size/increased tissue granulation at next dressing change  8/7/2025 2203 by Mala Russo RN  Outcome: Progressing  8/7/2025 2202 by Mala Russo RN  Outcome: Progressing  Goal: Participates in plan/prevention/treatment measures  8/7/2025 2203 by Mala Russo RN  Outcome: Progressing  8/7/2025 2202 by Mala Russo RN  Outcome: Progressing  Goal:  Prevent/manage excess moisture  8/7/2025 2203 by Mala Russo RN  Outcome: Progressing  8/7/2025 2202 by Mala Russo RN  Outcome: Progressing  Goal: Prevent/minimize sheer/friction injuries  8/7/2025 2203 by Mala Russo RN  Outcome: Progressing  8/7/2025 2202 by Mala Russo RN  Outcome: Progressing  Goal: Promote/optimize nutrition  8/7/2025 2203 by Mala Russo RN  Outcome: Progressing  8/7/2025 2202 by Mala Russo RN  Outcome: Progressing  Goal: Promote skin healing  8/7/2025 2203 by Mala Russo RN  Outcome: Progressing  8/7/2025 2202 by Mala Russo RN  Outcome: Progressing

## 2025-08-08 NOTE — DISCHARGE SUMMARY
Discharge Diagnosis  Unilateral primary osteoarthritis, right hip    Issues Requiring Follow-Up  None     Test Results Pending At Discharge  Pending Labs       Order Current Status    Surgical Pathology Exam In process            Hospital Course   Patient is a 50 year old female with severe osteoarthritis of right hip.  On 8/7/25, patient underwent uncomplicated right total hip replacement via anterior approach by Dr Patel.  General anesthesia was utilized and estimated blood loss intraoperatively was 300cc.  Patient received one dose of IV antibiotics preoperatively and 2 additional doses were administered postoperatively.  For DVT prophylaxis, patient was ordered aspirin 81mg twice a day and bilateral SCDs.  Physical and occupational therapy were initiated with full weight bearing status.    Patient progressed well with therapy.  Her hemoglobin level was stable at 10.8 and she remained afebrile.  On POD #1, patient was medically stable and safe for discharge home with [facility] follow up.  At time of discharge, patient was ambulating 75 feet with use of wheeled walker and stand by assistance.  She was also able to demonstrate safe stair climbing with contact guard assist.  She was instructed to continue to take aspirin 81mg twice daily at home for total of 30 days.  In addition, bilateral naomi hose compression stockings were provided along with instructions to wear at home for 4 weeks.  For pain management, toradol and flexeril scripts were given. Per patient's request, she was attempting to not take any narcotic medications.  She will follow up in office with Dr Patel in 3 weeks.      Visit Vitals  /59 (BP Location: Right arm, Patient Position: Lying)   Pulse 71   Temp 36.4 °C (97.5 °F) (Temporal)   Resp 18     Vitals:    08/07/25 1452   Weight: 79 kg (174 lb 2.6 oz)       Immunization History   Administered Date(s) Administered    COVID-19, mRNA, LNP-S, PF, 30 mcg/0.3 mL dose 09/16/2021        Results      Pertinent Physical Exam At Time of Discharge  Physical Exam    Home Medications     Medication List      START taking these medications     acetaminophen 325 mg tablet; Commonly known as: Tylenol; Take 3 tablets   (975 mg) by mouth 3 times a day for 14 days.   aspirin 81 mg EC tablet; Take 1 tablet (81 mg) by mouth 2 times a day.   cyclobenzaprine 10 mg tablet; Commonly known as: Flexeril; Take 1 tablet   (10 mg) by mouth 3 times a day as needed for muscle spasms for up to 5   days.   ketorolac 10 mg tablet; Commonly known as: Toradol; Take 1 tablet (10   mg) by mouth every 8 hours if needed for severe pain (7 - 10).   polyethylene glycol 17 gram/dose powder; Commonly known as: Glycolax,   Miralax; Mix 1 capful (17 g) of powder with 4 to 8 ounces of water or   juice and drink once daily as needed (for constipation).     CONTINUE taking these medications     Vitamin D3 25 mcg (1,000 units) tablet; Generic drug: cholecalciferol     STOP taking these medications     chlorhexidine 0.12 % solution; Commonly known as: Peridex   Kae-Hex 4 % external liquid; Generic drug: chlorhexidine       Outpatient Follow-Up  Future Appointments   Date Time Provider Department Center   8/9/2025 To Be Determined Dom Pizarro, PT [facility]   8/22/2025 11:15 AM Lit Patel MD [id number]         *I spent 40 minutes in the professional and overall care of this patient    Johanna Sanchez, YAEL-CNS

## 2025-08-08 NOTE — PROGRESS NOTES
08/08/25 1209   Discharge Planning   Living Arrangements Children   Support Systems Children;Parent   Assistance Needed walker   Type of Residence Private residence   Number of Stairs to Enter Residence   (1 flight)   Number of Stairs Within Residence 0   Do you have animals or pets at home? Yes   Type of Animals or Pets 1 cat   Who is requesting discharge planning? Provider   Home or Post Acute Services In home services   Type of Home Care Services Home OT;Home PT   Expected Discharge Disposition Home H   Does the patient need discharge transport arranged? No   Intensity of Service   Intensity of Service 0-30 min     Met with patient at bedside. Introduced self and role as care coordinator. Demographics verified. Patient PCP is Allyson. Patient insurance is [employer]. Patient lives with daughters. Patient will be going to stay with her mother for a few weeks while she recovers. Patient is set up with [facility] for therapy. Patient will be using a walker. Care coordinators will follow for discharge planning.

## 2025-08-08 NOTE — PROGRESS NOTES
"Jennifer Ramirez is a 50 y.o. female on day 0 of admission presenting with Unilateral primary osteoarthritis, right hip.    Subjective   Patient sitting up in chair c/o moderate amount of incisional pain  Denies any c/o nausea tolerating regular diet        Objective     Physical Exam  Vitals reviewed.     Musculoskeletal:      Comments: Neurovascular status WNL RLE  Moderate amount of edema present to right hip/thigh     Skin:     Comments: Mepilex dressing D&I to right hip incision     Neurological:      Mental Status: She is alert.         Last Recorded Vitals  Blood pressure 101/59, pulse 71, temperature 36.4 °C (97.5 °F), temperature source Temporal, resp. rate 18, height 1.626 m (5' 4.02\"), weight 79 kg (174 lb 2.6 oz), SpO2 93%.       Intake/Output last 3 Shifts:  I/O last 3 completed shifts:  In: 1885.8 (23.9 mL/kg) [I.V.:1135.8 (14.4 mL/kg); IV Piggyback:750]  Out: 800 (10.1 mL/kg) [Urine:800 (0.3 mL/kg/hr)]  Weight: 79 kg     Relevant Results      Scheduled medications  Scheduled Medications[1]  Continuous medications  Continuous Medications[2]  PRN medications  PRN Medications[3]  Results for orders placed or performed during the hospital encounter of 08/07/25 (from the past 24 hours)   POCT pregnancy, urine   Result Value Ref Range    Preg Test, Ur Negative Negative   CBC   Result Value Ref Range    WBC 11.4 (H) 4.4 - 11.3 x10*3/uL    nRBC 0.0 0.0 - 0.0 /100 WBCs    RBC 3.44 (L) 4.00 - 5.20 x10*6/uL    Hemoglobin 10.8 (L) 12.0 - 16.0 g/dL    Hematocrit 33.9 (L) 36.0 - 46.0 %    MCV 99 80 - 100 fL    MCH 31.4 26.0 - 34.0 pg    MCHC 31.9 (L) 32.0 - 36.0 g/dL    RDW 12.1 11.5 - 14.5 %    Platelets 223 150 - 450 x10*3/uL   Basic metabolic panel   Result Value Ref Range    Glucose 107 (H) 74 - 99 mg/dL    Sodium 137 136 - 145 mmol/L    Potassium 4.2 3.5 - 5.3 mmol/L    Chloride 105 98 - 107 mmol/L    Bicarbonate 25 21 - 32 mmol/L    Anion Gap 11 10 - 20 mmol/L    Urea Nitrogen 14 6 - 23 mg/dL    Creatinine 1.02 " 0.50 - 1.05 mg/dL    eGFR 67 >60 mL/min/1.73m*2    Calcium 8.7 8.6 - 10.3 mg/dL                            Assessment & Plan  Unilateral primary osteoarthritis, right hip    S/P total right hip arthroplasty  POD #1:  Patient's hemoglobin level from this am stable at 10.8.  BMP results also reviewed.  Continue PT/OT with [facility].  Plan discharge home later today if patient safe     Begin aspirin 81mg twice a day this am.  Bilateral SCDs to be worn while patient in bed.  Increase patient's activity as tolerated.  Continue to minimize use of narcotics per patient's request.  At this time, patient has only received tylenol and toradol for adequate pain control.  Will plan to discharge patient home with tramadol, flexeril, and toradol.          Johanna Sanchez, APRN-CNS           [1] acetaminophen, 650 mg, oral, q6h NORMA  aspirin, 81 mg, oral, BID  polyethylene glycol, 17 g, oral, Daily  povidone-iodine, 1 kit, Topical, Once    [2] lactated Ringer's, 50 mL/hr, Last Rate: 50 mL/hr (08/08/25 0658)  oxygen, , Last Rate: Stopped (08/07/25 4825)    [3] PRN medications: bisacodyl, bisacodyl, cyclobenzaprine, diphenhydrAMINE **OR** diphenhydrAMINE, ondansetron ODT **OR** ondansetron, promethazine **OR** promethazine

## 2025-08-08 NOTE — PROGRESS NOTES
Occupational Therapy    OT Treatment    Patient Name: Jennifer Ramirez  MRN: [id number]  Department: [facility]  Room: [address] [place] [address]  Today's Date: 8/8/2025  Time Calculation  Start Time: 0845  Stop Time: 0920  Time Calculation (min): 35 min        Assessment:  End of Session Communication: Bedside nurse  End of Session Patient Position: Up in chair, Alarm on     Plan:  Treatment Interventions: ADL retraining, Functional transfer training, Endurance training, Patient/family training, Equipment evaluation/education  OT Frequency: 3 times per week  OT Discharge Recommendations: Low intensity level of continued care  OT Recommended Transfer Status: Assist of 1, Stand by assist, Minimal assist  OT - OK to Discharge: Yes (when medically cleared)  Treatment Interventions: ADL retraining, Functional transfer training, Endurance training, Patient/family training, Equipment evaluation/education    Subjective   OT Visit Info:  OT Received On: 08/08/25  General Visit Info:  General  Family/Caregiver Present: Yes (Sister)  Prior to Session Communication: Bedside nurse  Patient Position Received: Up in chair, Alarm on  General Comment: Pt agreeable to therapy  Precautions:  Medical Precautions: Fall precautions  Post-Surgical Precautions: Right hip precautions      Vital Signs Comment: No concerns     Pain:  Pain Assessment  Pain Assessment: 0-10 (Pt states that pain level is at a 5/10 in R hip.)  Pain Interventions: Cold applied    Objective    Cognition:  Cognition  Overall Cognitive Status: Within Functional Limits  Orientation Level: Oriented X4    Activities of Daily Living: LE Dressing  LE Dressing: Yes  LE Dressing Adaptive Equipment: Reacher, Sock aide  Pants Level of Assistance: Close supervision  Sock Level of Assistance: Close supervision  Compression Hose Level of Assistance: Maximum assistance (Pt will have assist at home for donning and doffing)  LE Dressing Where Assessed: Chair    Bed Mobility/Transfers: Transfers  Transfer:  Yes (Pt completed sit <>stand at SBA)      Functional Mobility:  Functional Mobility  Functional Mobility Performed: Yes  Functional Mobility 1  Surface 1: Level tile  Device 1: Rolling walker  Assistance 1: Close supervision  Comments 1: Pt able to ambulate household distances to complete grooming task at sinkside    Outcome Measures:[facility] Daily Activity  Putting on and taking off regular lower body clothing: A little  Bathing (including washing, rinsing, drying): A little  Putting on and taking off regular upper body clothing: None  Toileting, which includes using toilet, bedpan or urinal: A little  Taking care of personal grooming such as brushing teeth: A little  Eating Meals: None  Daily Activity - Total Score: 20        Education Documentation  Body Mechanics, taught by ARCELIA Norman at 8/8/2025  9:34 AM.  Learner: Patient  Readiness: Acceptance  Method: Explanation  Response: Verbalizes Understanding    Precautions, taught by ARCELIA Norman at 8/8/2025  9:34 AM.  Learner: Patient  Readiness: Acceptance  Method: Explanation  Response: Verbalizes Understanding    ADL Training, taught by ARCELIA Norman at 8/8/2025  9:34 AM.  Learner: Patient  Readiness: Acceptance  Method: Explanation  Response: Verbalizes Understanding    Education Comments  Pt educated on use of compression stockings, importance of compression stockings, donning/doffing, and wear schedule.Pt educated on THR precautions as applied to self care tasks and transfers throughout tx session. Pt verbalized and demonstrated understanding. Pt educated on transferring in/out of tub adhering to precautions. This therapist recommending completing with home health therapist to complete in home set up for further recommendations and increased safety. Pt educated on transferring in/out of car adhering to precautions. Following demonstration from this therapist pt verbalized understanding.          Goals:  Encounter Problems       Encounter  Problems (Active)       ADLs       Patient with complete lower body dressing with modified independent level of assistance  with PRN adaptive equipment while supported sitting (Progressing)       Start:  08/07/25    Expected End:  08/21/25            Patient will complete toileting including hygiene clothing management/hygiene with modified independent level of assistance. (Progressing)       Start:  08/07/25    Expected End:  08/21/25               MOBILITY       Patient will perform Functional mobility max Household distances/Community Distances with modified independent level of assistance and front wheeled walker in order to improve safety and functional mobility. (Progressing)       Start:  08/07/25    Expected End:  08/21/25               TRANSFERS       Patient will perform bed mobility modified independent level of assistance in order to improve safety and independence with mobility (Progressing)       Start:  08/07/25    Expected End:  08/21/25

## 2025-08-08 NOTE — CARE PLAN
The patient's goals for the shift include      The clinical goals for the shift include pain management, safe ambulation      Problem: Pain - Adult  Goal: Verbalizes/displays adequate comfort level or baseline comfort level  Outcome: Progressing     Problem: Safety - Adult  Goal: Free from fall injury  Outcome: Progressing     Problem: Discharge Planning  Goal: Discharge to home or other facility with appropriate resources  Outcome: Progressing     Problem: Chronic Conditions and Co-morbidities  Goal: Patient's chronic conditions and co-morbidity symptoms are monitored and maintained or improved  Outcome: Progressing     Problem: Nutrition  Goal: Nutrient intake appropriate for maintaining nutritional needs  Outcome: Progressing     Problem: Fall/Injury  Goal: Not fall by end of shift  Outcome: Progressing  Goal: Be free from injury by end of the shift  Outcome: Progressing  Goal: Verbalize understanding of personal risk factors for fall in the hospital  Outcome: Progressing  Goal: Verbalize understanding of risk factor reduction measures to prevent injury from fall in the home  Outcome: Progressing  Goal: Use assistive devices by end of the shift  Outcome: Progressing  Goal: Pace activities to prevent fatigue by end of the shift  Outcome: Progressing     Problem: Pain  Goal: Takes deep breaths with improved pain control throughout the shift  Outcome: Progressing  Goal: Turns in bed with improved pain control throughout the shift  Outcome: Progressing  Goal: Walks with improved pain control throughout the shift  Outcome: Progressing  Goal: Performs ADL's with improved pain control throughout shift  Outcome: Progressing  Goal: Participates in PT with improved pain control throughout the shift  Outcome: Progressing  Goal: Free from opioid side effects throughout the shift  Outcome: Progressing  Goal: Free from acute confusion related to pain meds throughout the shift  Outcome: Progressing     Problem: Skin  Goal:  Decreased wound size/increased tissue granulation at next dressing change  Outcome: Progressing  Flowsheets (Taken 8/8/2025 1000)  Decreased wound size/increased tissue granulation at next dressing change: Promote sleep for wound healing  Goal: Participates in plan/prevention/treatment measures  Outcome: Progressing  Flowsheets (Taken 8/8/2025 1000)  Participates in plan/prevention/treatment measures: Elevate heels  Goal: Prevent/manage excess moisture  Outcome: Progressing  Flowsheets (Taken 8/8/2025 1000)  Prevent/manage excess moisture: Moisturize dry skin  Goal: Prevent/minimize sheer/friction injuries  Outcome: Progressing  Flowsheets (Taken 8/8/2025 1000)  Prevent/minimize sheer/friction injuries: Increase activity/out of bed for meals  Goal: Promote/optimize nutrition  Outcome: Progressing  Flowsheets (Taken 8/8/2025 1000)  Promote/optimize nutrition: Consume > 50% meals/supplements  Goal: Promote skin healing  Outcome: Progressing  Flowsheets (Taken 8/8/2025 1000)  Promote skin healing: Assess skin/pad under line(s)/device(s)

## 2025-08-08 NOTE — ASSESSMENT & PLAN NOTE
POD #1:  Patient's hemoglobin level from this am stable at 10.8.  BMP results also reviewed.  Continue PT/OT with [facility].  Plan discharge home later today if patient safe

## 2025-08-08 NOTE — HH CARE COORDINATION
Home Care received a Referral for Physical Therapy. We have processed the referral for a Start of Care on 8/9/2025.     If you have any questions or concerns, please feel free to contact us at [phone]. Follow the prompts, enter your five digit zip code, and you will be directed to your care team on [facility].

## 2025-08-09 ENCOUNTER — HOME CARE VISIT (OUTPATIENT)
Dept: HOME HEALTH SERVICES | Facility: HOME HEALTH | Age: 50
End: 2025-08-09
Payer: MEDICAID

## 2025-08-09 PROCEDURE — G0151 HHCP-SERV OF PT,EA 15 MIN: HCPCS

## 2025-08-09 ASSESSMENT — ENCOUNTER SYMPTOMS
PAIN: 1
LOWEST PAIN SEVERITY IN PAST 24 HOURS: 3/10
AGGRESSION WITHIN DEFINED LIMITS: 1
HIGHEST PAIN SEVERITY IN PAST 24 HOURS: 5/10
PERSON REPORTING PAIN: PATIENT
ANGER WITHIN DEFINED LIMITS: 1
SUBJECTIVE PAIN PROGRESSION: GRADUALLY IMPROVING
PAIN LOCATION - PAIN SEVERITY: 3/10
PAIN LOCATION: RIGHT HIP
SLEEP QUALITY: ADEQUATE

## 2025-08-09 ASSESSMENT — ACTIVITIES OF DAILY LIVING (ADL)
AMBULATION ASSISTANCE ON FLAT SURFACES: 1
OASIS_M1830: 05
AMBULATION_DISTANCE/DURATION_TOLERATED: 20'
ENTERING_EXITING_HOME: STAND BY ASSIST

## 2025-08-12 ENCOUNTER — HOME CARE VISIT (OUTPATIENT)
Dept: HOME HEALTH SERVICES | Facility: HOME HEALTH | Age: 50
End: 2025-08-12
Payer: MEDICAID

## 2025-08-12 VITALS
DIASTOLIC BLOOD PRESSURE: 80 MMHG | HEART RATE: 80 BPM | TEMPERATURE: 100.1 F | SYSTOLIC BLOOD PRESSURE: 131 MMHG | OXYGEN SATURATION: 99 %

## 2025-08-12 PROCEDURE — G0157 HHC PT ASSISTANT EA 15: HCPCS | Mod: CQ

## 2025-08-12 ASSESSMENT — ENCOUNTER SYMPTOMS
PAIN: 1
PERSON REPORTING PAIN: PATIENT
PAIN LOCATION: RIGHT HIP
PAIN LOCATION - PAIN SEVERITY: 2/10
HIGHEST PAIN SEVERITY IN PAST 24 HOURS: 4/10

## 2025-08-13 SDOH — HEALTH STABILITY: PHYSICAL HEALTH
EXERCISE COMMENTS: COMPLETED SUPINE AP, QS, GS, HEEL SLIDES, SAQ, HIP AB/ADDUCTION, 15X FOLLOWED BY STANDING HEEL RAISES, MARCHES AND HIP ABDUCTION 10X WITH TWO HANDED SUPPORT. INSTRUCTIONS AND TACTILE CUES FOR ALIGNMENT, PACING, BREATHING OUT WITH EXERTION AND EXERCIS

## 2025-08-13 SDOH — HEALTH STABILITY: PHYSICAL HEALTH: EXERCISE COMMENTS: ING AS TOLERATED.

## 2025-08-14 ENCOUNTER — HOME CARE VISIT (OUTPATIENT)
Dept: HOME HEALTH SERVICES | Facility: HOME HEALTH | Age: 50
End: 2025-08-14
Payer: MEDICAID

## 2025-08-14 VITALS
DIASTOLIC BLOOD PRESSURE: 84 MMHG | TEMPERATURE: 98.4 F | SYSTOLIC BLOOD PRESSURE: 128 MMHG | OXYGEN SATURATION: 97 % | HEART RATE: 78 BPM

## 2025-08-14 PROCEDURE — G0157 HHC PT ASSISTANT EA 15: HCPCS | Mod: CQ

## 2025-08-15 SDOH — HEALTH STABILITY: PHYSICAL HEALTH
EXERCISE COMMENTS: COMPLETED SUPINE AP, QS, GS, HEEL SLIDES, SAQ AND HAMSTRING SETS 15X, STANDING HEEL RAISES, MINI-SQUATS, HIP ABDUCTION WITH INSTRUCTIONS/REINFORCEMENT FOR ALIGNMENT AND TECHNIQUE, BREATHING OUT WITH EXERTION 10X.

## 2025-08-16 LAB
LABORATORY COMMENT REPORT: NORMAL
PATH REPORT.FINAL DX SPEC: NORMAL
PATH REPORT.GROSS SPEC: NORMAL
PATH REPORT.RELEVANT HX SPEC: NORMAL
PATH REPORT.TOTAL CANCER: NORMAL

## 2025-08-18 ENCOUNTER — HOME CARE VISIT (OUTPATIENT)
Dept: HOME HEALTH SERVICES | Facility: HOME HEALTH | Age: 50
End: 2025-08-18
Payer: MEDICAID

## 2025-08-18 VITALS
HEART RATE: 89 BPM | OXYGEN SATURATION: 98 % | TEMPERATURE: 98.6 F | DIASTOLIC BLOOD PRESSURE: 70 MMHG | SYSTOLIC BLOOD PRESSURE: 112 MMHG

## 2025-08-18 PROCEDURE — G0157 HHC PT ASSISTANT EA 15: HCPCS | Mod: CQ

## 2025-08-19 SDOH — HEALTH STABILITY: PHYSICAL HEALTH
EXERCISE COMMENTS: STRENGTH TRAINING INCLUDING SUPINE AP, QS, GS, SAQ, HEEL SLIDES, AND STANDING HEEL RAISE, MARCHES, HIP ABDUCTION, ALTERNATING HAMSTRING CURLS, AND MINI-SQUATS 15X WITH REINFORCEMENT FOR ALIGNMENT AND ECCENTRIC CONTROL "UP FAST, DOWN SLOW" AND BREATHI

## 2025-08-19 SDOH — HEALTH STABILITY: PHYSICAL HEALTH: EXERCISE COMMENTS: NG OUT WITH EXERTION.

## 2025-08-19 ASSESSMENT — ENCOUNTER SYMPTOMS
PERSON REPORTING PAIN: PATIENT
HIGHEST PAIN SEVERITY IN PAST 24 HOURS: 2/10
PAIN LOCATION - PAIN SEVERITY: 0/10
PAIN: 1
PAIN LOCATION: RIGHT HIP

## 2025-08-20 ENCOUNTER — HOSPITAL ENCOUNTER (OUTPATIENT)
Dept: RADIOLOGY | Facility: HOSPITAL | Age: 50
Discharge: HOME | End: 2025-08-20
Payer: MEDICAID

## 2025-08-20 ENCOUNTER — APPOINTMENT (OUTPATIENT)
Dept: ORTHOPEDIC SURGERY | Facility: CLINIC | Age: 50
End: 2025-08-20
Payer: MEDICAID

## 2025-08-20 DIAGNOSIS — Z96.641 S/P HIP REPLACEMENT, RIGHT: ICD-10-CM

## 2025-08-20 PROCEDURE — 73502 X-RAY EXAM HIP UNI 2-3 VIEWS: CPT | Mod: RIGHT SIDE | Performed by: RADIOLOGY

## 2025-08-20 PROCEDURE — 73502 X-RAY EXAM HIP UNI 2-3 VIEWS: CPT | Mod: RT

## 2025-08-20 PROCEDURE — 99024 POSTOP FOLLOW-UP VISIT: CPT | Performed by: ORTHOPAEDIC SURGERY

## 2025-08-21 ENCOUNTER — HOME CARE VISIT (OUTPATIENT)
Dept: HOME HEALTH SERVICES | Facility: HOME HEALTH | Age: 50
End: 2025-08-21
Payer: MEDICAID

## 2025-08-21 PROCEDURE — G0151 HHCP-SERV OF PT,EA 15 MIN: HCPCS

## 2025-08-21 ASSESSMENT — ENCOUNTER SYMPTOMS
PAIN LOCATION: RIGHT HIP
PAIN: 1
PAIN LOCATION - PAIN SEVERITY: 0/10
HIGHEST PAIN SEVERITY IN PAST 24 HOURS: 3/10
PERSON REPORTING PAIN: PATIENT
LOWEST PAIN SEVERITY IN PAST 24 HOURS: 0/10

## 2025-08-21 ASSESSMENT — ACTIVITIES OF DAILY LIVING (ADL)
OASIS_M1830: 00
HOME_HEALTH_OASIS: 00

## 2025-08-22 ENCOUNTER — APPOINTMENT (OUTPATIENT)
Dept: ORTHOPEDIC SURGERY | Facility: CLINIC | Age: 50
End: 2025-08-22
Payer: MEDICAID

## 2025-08-25 ENCOUNTER — EVALUATION (OUTPATIENT)
Dept: PHYSICAL THERAPY | Facility: HOSPITAL | Age: 50
End: 2025-08-25
Payer: MEDICAID

## 2025-08-25 DIAGNOSIS — G89.18 ACUTE POSTOPERATIVE PAIN OF RIGHT HIP: ICD-10-CM

## 2025-08-25 DIAGNOSIS — M16.11 UNILATERAL PRIMARY OSTEOARTHRITIS, RIGHT HIP: Primary | ICD-10-CM

## 2025-08-25 DIAGNOSIS — M25.551 ACUTE POSTOPERATIVE PAIN OF RIGHT HIP: ICD-10-CM

## 2025-08-25 PROCEDURE — 97161 PT EVAL LOW COMPLEX 20 MIN: CPT | Mod: GP

## 2025-08-25 PROCEDURE — 97110 THERAPEUTIC EXERCISES: CPT | Mod: GP

## 2025-09-02 ENCOUNTER — TELEPHONE (OUTPATIENT)
Dept: PHYSICAL THERAPY | Facility: HOSPITAL | Age: 50
End: 2025-09-02
Payer: MEDICAID

## 2025-09-02 DIAGNOSIS — M25.551 ACUTE POSTOPERATIVE PAIN OF RIGHT HIP: ICD-10-CM

## 2025-09-02 DIAGNOSIS — G89.18 ACUTE POSTOPERATIVE PAIN OF RIGHT HIP: ICD-10-CM

## 2025-10-01 ENCOUNTER — APPOINTMENT (OUTPATIENT)
Dept: ORTHOPEDIC SURGERY | Facility: CLINIC | Age: 50
End: 2025-10-01
Payer: MEDICAID

## (undated) DEVICE — DRILL BIT, 3.3 X 25MM

## (undated) DEVICE — DRAPE, INSTRUMENT, W/POUCH, STERI DRAPE, 7 X 11 IN, DISPOSABLE, STERILE

## (undated) DEVICE — DRAPE, INCISE, ANTIMICROBIAL, IOBAN 2, STERI DRAPE, 23 X 33 IN, DISPOSABLE, STERILE

## (undated) DEVICE — PACK, ANTERIOR HIP REPLACEMENT KIT, CUSTOM, PARMA

## (undated) DEVICE — BASIN KIT, SINGLE

## (undated) DEVICE — IRRIGATION SET, CYSTOSCOPY, REGULATING CLAMP, STRAIGHT, 81 IN

## (undated) DEVICE — COVER, MAYO STAND, W/PAD, 23 IN, DISPOSABLE, PLASTIC, LF, STERILE

## (undated) DEVICE — ADHESIVE, SKIN, DERMABOND ADVANCED, 15CM, PEN-STYLE

## (undated) DEVICE — WOUND SYSTEM, DEBRIDEMENT & CLEANING, O.R DUOPAK

## (undated) DEVICE — APPLICATOR, CHLORAPREP, W/ORANGE TINT, 26ML

## (undated) DEVICE — MAT, AIR TRANSFER, 39X81

## (undated) DEVICE — DRAPE, SHEET, THREE QUARTER, FAN FOLD, 57 X 77 IN

## (undated) DEVICE — TIP, SUCTION, SUPER SUCKER, KAM, 1

## (undated) DEVICE — HOOD, SURGICAL, FLYTE HYBRID

## (undated) DEVICE — BLADE, SAGITTAL DUAL CUT, 25 X 90 X 1.27

## (undated) DEVICE — SYRINGE, 50 CC, LUER LOCK

## (undated) DEVICE — TUBING, CLEAR N-COND, 5MM X 10, LF

## (undated) DEVICE — BANDAGE, COFLEX, 6 X 5 YDS, FOAM TAN, STERILE, LF

## (undated) DEVICE — DRESSING, MEPILEX BORDER, POST-OP AG, 4 X 10 IN

## (undated) DEVICE — POSITION KIT, HANA PROFX SPINE

## (undated) DEVICE — TIP, SUCTION, YANKAUER, W/O VENT, FLEXIBLE, OPEN TIP, HIGH CAPACITY

## (undated) DEVICE — SEALER, BIPOLAR, AQUA MANTYS 6.0